# Patient Record
Sex: FEMALE | Race: WHITE | NOT HISPANIC OR LATINO | Employment: OTHER | URBAN - METROPOLITAN AREA
[De-identification: names, ages, dates, MRNs, and addresses within clinical notes are randomized per-mention and may not be internally consistent; named-entity substitution may affect disease eponyms.]

---

## 2017-05-03 ENCOUNTER — COMMUNICATION - HEALTHEAST (OUTPATIENT)
Dept: MEDSURG UNIT | Facility: CLINIC | Age: 82
End: 2017-05-03

## 2017-05-03 DIAGNOSIS — I10 UNSPECIFIED ESSENTIAL HYPERTENSION: ICD-10-CM

## 2017-05-11 ENCOUNTER — COMMUNICATION - HEALTHEAST (OUTPATIENT)
Dept: SCHEDULING | Facility: CLINIC | Age: 82
End: 2017-05-11

## 2017-05-15 ENCOUNTER — COMMUNICATION - HEALTHEAST (OUTPATIENT)
Dept: INTERNAL MEDICINE | Facility: CLINIC | Age: 82
End: 2017-05-15

## 2017-05-15 DIAGNOSIS — I10 UNSPECIFIED ESSENTIAL HYPERTENSION: ICD-10-CM

## 2017-05-22 ENCOUNTER — COMMUNICATION - HEALTHEAST (OUTPATIENT)
Dept: INTERNAL MEDICINE | Facility: CLINIC | Age: 82
End: 2017-05-22

## 2017-05-22 DIAGNOSIS — I10 HTN (HYPERTENSION): ICD-10-CM

## 2018-05-10 ENCOUNTER — COMMUNICATION - HEALTHEAST (OUTPATIENT)
Dept: INTERNAL MEDICINE | Facility: CLINIC | Age: 83
End: 2018-05-10

## 2018-05-10 DIAGNOSIS — I10 HTN (HYPERTENSION): ICD-10-CM

## 2018-05-10 DIAGNOSIS — I10 ESSENTIAL HYPERTENSION: ICD-10-CM

## 2018-08-26 ENCOUNTER — COMMUNICATION - HEALTHEAST (OUTPATIENT)
Dept: INTERNAL MEDICINE | Facility: CLINIC | Age: 83
End: 2018-08-26

## 2018-08-26 DIAGNOSIS — I10 ESSENTIAL HYPERTENSION: ICD-10-CM

## 2018-08-26 DIAGNOSIS — I10 HTN (HYPERTENSION): ICD-10-CM

## 2018-11-27 ENCOUNTER — OFFICE VISIT - HEALTHEAST (OUTPATIENT)
Dept: INTERNAL MEDICINE | Facility: CLINIC | Age: 83
End: 2018-11-27

## 2018-11-27 DIAGNOSIS — F41.1 GENERALIZED ANXIETY DISORDER: ICD-10-CM

## 2018-11-27 DIAGNOSIS — I10 HYPERTENSION, UNSPECIFIED TYPE: ICD-10-CM

## 2018-11-27 DIAGNOSIS — G45.9 TIA (TRANSIENT ISCHEMIC ATTACK): ICD-10-CM

## 2018-11-27 DIAGNOSIS — I10 ESSENTIAL HYPERTENSION: ICD-10-CM

## 2018-11-27 DIAGNOSIS — I10 HTN (HYPERTENSION): ICD-10-CM

## 2018-11-27 DIAGNOSIS — N20.0 NEPHROLITHIASIS: ICD-10-CM

## 2018-11-27 DIAGNOSIS — F34.1 DYSTHYMIA: ICD-10-CM

## 2018-11-27 LAB
ALBUMIN SERPL-MCNC: 3.7 G/DL (ref 3.5–5)
ALP SERPL-CCNC: 105 U/L (ref 45–120)
ALT SERPL W P-5'-P-CCNC: 15 U/L (ref 0–45)
ANION GAP SERPL CALCULATED.3IONS-SCNC: 11 MMOL/L (ref 5–18)
AST SERPL W P-5'-P-CCNC: 23 U/L (ref 0–40)
BILIRUB SERPL-MCNC: 0.7 MG/DL (ref 0–1)
BUN SERPL-MCNC: 22 MG/DL (ref 8–28)
CALCIUM SERPL-MCNC: 9.8 MG/DL (ref 8.5–10.5)
CHLORIDE BLD-SCNC: 105 MMOL/L (ref 98–107)
CO2 SERPL-SCNC: 27 MMOL/L (ref 22–31)
CREAT SERPL-MCNC: 0.69 MG/DL (ref 0.6–1.1)
ERYTHROCYTE [DISTWIDTH] IN BLOOD BY AUTOMATED COUNT: 12.6 % (ref 11–14.5)
GFR SERPL CREATININE-BSD FRML MDRD: >60 ML/MIN/1.73M2
GLUCOSE BLD-MCNC: 117 MG/DL (ref 70–125)
HCT VFR BLD AUTO: 41.7 % (ref 35–47)
HGB BLD-MCNC: 13.8 G/DL (ref 12–16)
MCH RBC QN AUTO: 30.1 PG (ref 27–34)
MCHC RBC AUTO-ENTMCNC: 33 G/DL (ref 32–36)
MCV RBC AUTO: 91 FL (ref 80–100)
PLATELET # BLD AUTO: 199 THOU/UL (ref 140–440)
PMV BLD AUTO: 9.7 FL (ref 7–10)
POTASSIUM BLD-SCNC: 3.9 MMOL/L (ref 3.5–5)
PROT SERPL-MCNC: 6.7 G/DL (ref 6–8)
RBC # BLD AUTO: 4.58 MILL/UL (ref 3.8–5.4)
SODIUM SERPL-SCNC: 143 MMOL/L (ref 136–145)
WBC: 6 THOU/UL (ref 4–11)

## 2018-11-27 RX ORDER — IBUPROFEN 400 MG/1
400 TABLET, FILM COATED ORAL EVERY 6 HOURS PRN
Qty: 30 TABLET | Refills: 0 | Status: SHIPPED | OUTPATIENT
Start: 2018-11-27 | End: 2021-07-19

## 2018-11-27 ASSESSMENT — MIFFLIN-ST. JEOR: SCORE: 1355.75

## 2018-11-28 ENCOUNTER — COMMUNICATION - HEALTHEAST (OUTPATIENT)
Dept: INTERNAL MEDICINE | Facility: CLINIC | Age: 83
End: 2018-11-28

## 2019-02-11 ENCOUNTER — COMMUNICATION - HEALTHEAST (OUTPATIENT)
Dept: INTERNAL MEDICINE | Facility: CLINIC | Age: 84
End: 2019-02-11

## 2019-02-11 DIAGNOSIS — F41.1 GENERALIZED ANXIETY DISORDER: ICD-10-CM

## 2019-02-13 RX ORDER — BUSPIRONE HYDROCHLORIDE 15 MG/1
15 TABLET ORAL DAILY PRN
Qty: 30 TABLET | Refills: 9 | Status: SHIPPED | OUTPATIENT
Start: 2019-02-13 | End: 2021-07-20

## 2019-03-21 ENCOUNTER — COMMUNICATION - HEALTHEAST (OUTPATIENT)
Dept: INTERNAL MEDICINE | Facility: CLINIC | Age: 84
End: 2019-03-21

## 2019-03-21 DIAGNOSIS — I10 HTN (HYPERTENSION): ICD-10-CM

## 2019-03-21 DIAGNOSIS — I10 ESSENTIAL HYPERTENSION: ICD-10-CM

## 2019-03-29 ENCOUNTER — OFFICE VISIT - HEALTHEAST (OUTPATIENT)
Dept: INTERNAL MEDICINE | Facility: CLINIC | Age: 84
End: 2019-03-29

## 2019-03-29 DIAGNOSIS — I82.499 DEEP VEIN THROMBOSIS (DVT) OF OTHER VEIN OF LOWER EXTREMITY, UNSPECIFIED CHRONICITY, UNSPECIFIED LATERALITY (H): ICD-10-CM

## 2019-03-29 DIAGNOSIS — F51.01 PRIMARY INSOMNIA: ICD-10-CM

## 2019-03-29 DIAGNOSIS — F41.1 GENERALIZED ANXIETY DISORDER: ICD-10-CM

## 2019-03-29 DIAGNOSIS — G45.9 TIA (TRANSIENT ISCHEMIC ATTACK): ICD-10-CM

## 2019-03-29 DIAGNOSIS — I10 HYPERTENSION, UNSPECIFIED TYPE: ICD-10-CM

## 2019-03-29 DIAGNOSIS — E66.9 OBESITY WITHOUT SERIOUS COMORBIDITY, UNSPECIFIED CLASSIFICATION, UNSPECIFIED OBESITY TYPE: ICD-10-CM

## 2019-03-29 ASSESSMENT — MIFFLIN-ST. JEOR: SCORE: 1355.75

## 2019-05-02 ENCOUNTER — COMMUNICATION - HEALTHEAST (OUTPATIENT)
Dept: CARE COORDINATION | Facility: CLINIC | Age: 84
End: 2019-05-02

## 2019-05-10 ENCOUNTER — OFFICE VISIT - HEALTHEAST (OUTPATIENT)
Dept: INTERNAL MEDICINE | Facility: CLINIC | Age: 84
End: 2019-05-10

## 2019-05-10 DIAGNOSIS — R42 POSTURAL DIZZINESS: ICD-10-CM

## 2019-05-10 ASSESSMENT — MIFFLIN-ST. JEOR: SCORE: 1346.68

## 2019-09-19 ENCOUNTER — COMMUNICATION - HEALTHEAST (OUTPATIENT)
Dept: INTERNAL MEDICINE | Facility: CLINIC | Age: 84
End: 2019-09-19

## 2019-09-19 DIAGNOSIS — I10 HTN (HYPERTENSION): ICD-10-CM

## 2019-09-19 DIAGNOSIS — I10 ESSENTIAL HYPERTENSION: ICD-10-CM

## 2020-07-03 ENCOUNTER — OFFICE VISIT - HEALTHEAST (OUTPATIENT)
Dept: INTERNAL MEDICINE | Facility: CLINIC | Age: 85
End: 2020-07-03

## 2020-07-03 DIAGNOSIS — I10 ESSENTIAL HYPERTENSION: ICD-10-CM

## 2020-07-03 DIAGNOSIS — F41.1 GENERALIZED ANXIETY DISORDER: ICD-10-CM

## 2020-07-03 DIAGNOSIS — I10 HTN (HYPERTENSION): ICD-10-CM

## 2020-07-03 DIAGNOSIS — I82.499 DEEP VEIN THROMBOSIS (DVT) OF OTHER VEIN OF LOWER EXTREMITY, UNSPECIFIED CHRONICITY, UNSPECIFIED LATERALITY (H): ICD-10-CM

## 2020-07-03 DIAGNOSIS — E78.2 MIXED HYPERLIPIDEMIA: ICD-10-CM

## 2020-07-03 RX ORDER — LISINOPRIL 10 MG/1
10 TABLET ORAL DAILY
Qty: 90 TABLET | Refills: 3 | Status: SHIPPED | OUTPATIENT
Start: 2020-07-03 | End: 2021-07-08

## 2020-07-03 RX ORDER — METOPROLOL TARTRATE 25 MG/1
25 TABLET, FILM COATED ORAL 2 TIMES DAILY
Qty: 180 TABLET | Refills: 3 | Status: SHIPPED | OUTPATIENT
Start: 2020-07-03 | End: 2021-07-07

## 2020-07-03 ASSESSMENT — PATIENT HEALTH QUESTIONNAIRE - PHQ9: SUM OF ALL RESPONSES TO PHQ QUESTIONS 1-9: 0

## 2021-05-25 ENCOUNTER — RECORDS - HEALTHEAST (OUTPATIENT)
Dept: ADMINISTRATIVE | Facility: CLINIC | Age: 86
End: 2021-05-25

## 2021-05-26 ENCOUNTER — RECORDS - HEALTHEAST (OUTPATIENT)
Dept: ADMINISTRATIVE | Facility: CLINIC | Age: 86
End: 2021-05-26

## 2021-05-27 ENCOUNTER — RECORDS - HEALTHEAST (OUTPATIENT)
Dept: ADMINISTRATIVE | Facility: CLINIC | Age: 86
End: 2021-05-27

## 2021-05-27 ASSESSMENT — PATIENT HEALTH QUESTIONNAIRE - PHQ9: SUM OF ALL RESPONSES TO PHQ QUESTIONS 1-9: 0

## 2021-05-27 NOTE — PROGRESS NOTES
Office Visit - Follow Up   Ruth Mack   87 y.o. female    Date of Visit: 3/29/2019    Chief Complaint   Patient presents with     Establish Care        Assessment and Plan   1. Hypertension, unspecified type  Blood pressure controlled continue lisinopril and metoprolol, labs reviewed    2. Deep vein thrombosis (DVT) of other vein of lower extremity, unspecified chronicity, unspecified laterality (H)  This was over 30 years ago no recurrence    3. Generalized anxiety disorder  Generally controlled with BuSpar as needed    4. TIA (transient ischemic attack)  2 years ago no recurrence, on aspirin, could consider adding a statin    5. Primary insomnia  She wants to stop mirtazapine which is okay.    6. Obesity without serious comorbidity, unspecified classification, unspecified obesity type  The following high BMI interventions were performed this visit: encouragement to exercise and lifestyle education regarding diet    Return in about 4 weeks (around 4/26/2019) for annual physical.     History of Present Illness   This 87 y.o. old woman comes in for follow-up.  She is a patient of Dr. Sandor Hay but has not seen him for years.  She did see Dr. Emiliano Ratliff in the fall.  She is now here to follow-up.  She does not really have any specific concerns.  She is considering switching physicians but is not sure.  She does not want and establish care or physical exam visit at this point.  We did review her medical history and updated the chart.  Some left shoulder pain with activity which responds well to ice rest and ibuprofen or acetaminophen.  She does have possible history of a TIA no recurrence and this was a couple of years ago.  She does take an aspirin.  She is not on a statin.  She was put on Remeron to help her with sleep but does not want to take this anymore.    Review of Systems: A comprehensive review of systems was negative except as noted.     Medications, Allergies and Problem List   Reviewed,  "reconciled and updated     Physical Exam   General Appearance:   No acute distress    /66 (Patient Site: Left Arm, Patient Position: Sitting, Cuff Size: Adult Regular)   Pulse 62   Ht 5' 5\" (1.651 m)   Wt 205 lb (93 kg)   SpO2 92%   BMI 34.11 kg/m      HEENT exam is unremarkable  Neck supple no thyromegaly or nodule palpable  Lymphatic no cervical lymphadenopathy  Cardiovascular regular rate and rhythm no murmur gallop or rub  Pulmonary lungs are clear to auscultation bilaterally  Gastrointestinall abdomen soft nontender nondistended no organomegaly  Neurologic exam is non focal  Psychiatric pleasant, no confusion or agitation        Additional Information   Current Outpatient Medications   Medication Sig Dispense Refill     aspirin 81 mg chewable tablet Chew 81 mg daily.       busPIRone (BUSPAR) 15 MG tablet TAKE 1 TABLET (15 MG TOTAL) BY MOUTH DAILY AS NEEDED. 30 tablet 9     ibuprofen (ADVIL,MOTRIN) 400 MG tablet Take 1 tablet (400 mg total) by mouth every 6 (six) hours as needed for pain. 30 tablet 0     lisinopril (PRINIVIL,ZESTRIL) 10 MG tablet Take 1 tablet (10 mg total) by mouth daily. 90 tablet 1     metoprolol tartrate (LOPRESSOR) 25 MG tablet Take 1 tablet (25 mg total) by mouth 2 (two) times a day. 180 tablet 1     No current facility-administered medications for this visit.      Allergies   Allergen Reactions     Codeine Other (See Comments)     hallucinations     Social History     Tobacco Use     Smoking status: Never Smoker     Smokeless tobacco: Never Used   Substance Use Topics     Alcohol use: Yes     Comment: rare     Drug use: No       Review and/or order of clinical lab tests:  Review and/or order of radiology tests:  Review and/or order of medicine tests:  Discussion of test results with performing physician:  Decision to obtain old records and/or obtain history from someone other than the patient:  Review and summarization of old records and/or obtaining history from someone other " than the patient and.or discussion of case with another health care provider:  Independent visualization of image, tracing or specimen itself:    Time:      Steve Puente MD

## 2021-05-28 ENCOUNTER — RECORDS - HEALTHEAST (OUTPATIENT)
Dept: ADMINISTRATIVE | Facility: CLINIC | Age: 86
End: 2021-05-28

## 2021-05-28 NOTE — PROGRESS NOTES
"  Office Visit - Follow Up   Ruth Mack   87 y.o. female    Date of Visit: 5/10/2019    Chief Complaint   Patient presents with     Hospital Visit Follow Up     Was in the hospital with Vertigo, hasn't had any more spells        Assessment and Plan   1. Postural dizziness  Ruth was in the hospital 24 hours beginning on 4/30/2019.  She presented with incapacitating dizziness.  In the ER she could not get about the ER nor could she get out of her bed in the ER without help.  She had no further significant episodes in the hospital.  We monitored her.  I discharged her on meclizine.  She had no further episodes of dizziness since discharge.  In the hospital we talked about getting some home services for her as she is 87 years old and lives alone.  She refuses services.  Today she does not want any home physical therapy or occupational therapy or home nursing services.  She has a neighbor who is a physical therapist that she can call on as needed.  She drove down here to the office.  He drives very little however.          No follow-ups on file.     History of Present Illness   This 87 y.o. old seen in office follow-up today after an episode of dizziness in the hospital.  Events as described above.  I reviewed with her the discharge summary also.  She took one meclizine pill since discharge from home.  Otherwise feels entirely well.  Offers no other complaints.    Review of Systems: A comprehensive review of systems was negative except as noted.     Medications, Allergies and Problem List   Reviewed, reconciled and updated     Physical Exam   General Appearance:       /82 (Patient Site: Right Arm, Patient Position: Sitting, Cuff Size: Adult Large)   Pulse 76   Ht 5' 5\" (1.651 m)   Wt 203 lb (92.1 kg)   SpO2 96%   BMI 33.78 kg/m      No nystagmus.  EOMs are full.  Weight is down 8 pounds compared to visit here in March/2019.  Blood pressure is good at 138/82 pulse is regular.  Lungs are clear heart " shows no murmurs.     Additional Information   Current Outpatient Medications   Medication Sig Dispense Refill     aspirin 81 mg chewable tablet Chew 81 mg daily.       busPIRone (BUSPAR) 15 MG tablet TAKE 1 TABLET (15 MG TOTAL) BY MOUTH DAILY AS NEEDED. 30 tablet 9     ibuprofen (ADVIL,MOTRIN) 400 MG tablet Take 1 tablet (400 mg total) by mouth every 6 (six) hours as needed for pain. 30 tablet 0     lisinopril (PRINIVIL,ZESTRIL) 10 MG tablet Take 1 tablet (10 mg total) by mouth daily. 90 tablet 1     meclizine (ANTIVERT) 12.5 mg tablet Take 1 tablet (12.5 mg total) by mouth 4 (four) times a day as needed for dizziness. 30 tablet 0     metoprolol tartrate (LOPRESSOR) 25 MG tablet Take 1 tablet (25 mg total) by mouth 2 (two) times a day. 180 tablet 1     No current facility-administered medications for this visit.      Allergies   Allergen Reactions     Codeine Other (See Comments)     hallucinations     Social History     Tobacco Use     Smoking status: Never Smoker     Smokeless tobacco: Never Used   Substance Use Topics     Alcohol use: Yes     Comment: rare     Drug use: No       Review and/or order of clinical lab tests:  Review and/or order of radiology tests:  Review and/or order of medicine tests:  Discussion of test results with performing physician:  Decision to obtain old records and/or obtain history from someone other than the patient:  Review and summarization of old records and/or obtaining history from someone other than the patient and.or discussion of case with another health care provider:  Independent visualization of image, tracing or specimen itself:    Time: total time spent with the patient was 25 minutes of which >50% was spent in counseling and coordination of care     Sandor Hay MD

## 2021-05-28 NOTE — PROGRESS NOTES
"TCM DISCHARGE FOLLOW UP CALL    Discharge Date:  5/1/2019  Reason for hospital stay (discharge diagnosis)::  Dizzness  Are you feeling better, the same or worse since your discharge?:  Patient is feeling better (Slept well, had a good night.  No dizziness since Tuesday night in the hospital.)  Do you feel like you have a plan in the event of a health emergency?: Yes (Call daughter, neighbor)    As part of your discharge plan, were  home care services ordered for you?: No    Did you receive any new medications, or was there a change to your medications?: Yes    Are you taking those medications, or do you have any established regiment?:  RN reviewed discharge medications w/pt.  Pt states she picked up meclizine to have on hand, but hasn't had to take it.  She asked about a prescription for a \"statin,\" stating she thought she was given a statin in the hospital but wasn't discharged w/any medications other than meclizine.  RN advised pt discuss starting a statin w/Dr. Hay at f/u appt, and pt felt comfortable w/this plan.  Do you have any follow up visits scheduled with your PCP or Specialist?:  No  I'm glad to hear you're doing well and we want you to continue to do well. Your PCP would like to see you for a follow-up visit. Can we help set that up for your today?: Yes    (RN) Patient was assisted in making appointment and/or given number to [facility].  If there are immediate concerns, In Basket messge route to the PCP with a summary of concern.:  RN assisted patient in scheduling appt (Dr. Hay 5/10/19)      Lucila Avendano RN Care Manager, Population Health    "

## 2021-05-29 ENCOUNTER — RECORDS - HEALTHEAST (OUTPATIENT)
Dept: ADMINISTRATIVE | Facility: CLINIC | Age: 86
End: 2021-05-29

## 2021-05-30 ENCOUNTER — RECORDS - HEALTHEAST (OUTPATIENT)
Dept: ADMINISTRATIVE | Facility: CLINIC | Age: 86
End: 2021-05-30

## 2021-06-01 NOTE — TELEPHONE ENCOUNTER
Refill Approved    Rx renewed per Medication Renewal Policy. Medication was last renewed on 3/21/19.    Thais Mitchell, [facility] Triage/Med Refill 9/19/2019     Requested Prescriptions   Pending Prescriptions Disp Refills     metoprolol tartrate (LOPRESSOR) 25 MG tablet [Pharmacy Med Name: METOPROLOL TARTRATE 25 MG TAB] 180 tablet 1     Sig: TAKE 1 TABLET BY MOUTH TWICE A DAY       Beta-Blockers Refill Protocol Passed - 9/19/2019 12:00 PM        Passed - PCP or prescribing provider visit in past 12 months or next 3 months     Last office visit with prescriber/PCP: 5/10/2019 Sandor Hay MD OR same dept: 5/10/2019 Sandor Hay MD OR same specialty: 5/10/2019 Sandor Hay MD  Last physical: Visit date not found Last MTM visit: Visit date not found   Next visit within 3 mo: Visit date not found  Next physical within 3 mo: Visit date not found  Prescriber OR PCP: Sandor Hay MD  Last diagnosis associated with med order: 1. HTN (hypertension)  - metoprolol tartrate (LOPRESSOR) 25 MG tablet [Pharmacy Med Name: METOPROLOL TARTRATE 25 MG TAB]; TAKE 1 TABLET BY MOUTH TWICE A DAY  Dispense: 180 tablet; Refill: 1    2. Essential hypertension  - lisinopril (PRINIVIL,ZESTRIL) 10 MG tablet [Pharmacy Med Name: LISINOPRIL 10 MG TABLET]; TAKE 1 TABLET BY MOUTH EVERY DAY  Dispense: 90 tablet; Refill: 1    If protocol passes may refill for 12 months if within 3 months of last provider visit (or a total of 15 months).             Passed - Blood pressure filed in past 12 months     BP Readings from Last 1 Encounters:   05/10/19 138/82             lisinopril (PRINIVIL,ZESTRIL) 10 MG tablet [Pharmacy Med Name: LISINOPRIL 10 MG TABLET] 90 tablet 1     Sig: TAKE 1 TABLET BY MOUTH EVERY DAY       Ace Inhibitors Refill Protocol Passed - 9/19/2019 12:00 PM        Passed - PCP or prescribing provider visit in past 12 months       Last office visit with prescriber/PCP: 5/10/2019 Sandor Hay MD  OR same dept: 5/10/2019 Sandor Hay MD OR same specialty: 5/10/2019 Sandor Hay MD  Last physical: Visit date not found Last MTM visit: Visit date not found   Next visit within 3 mo: Visit date not found  Next physical within 3 mo: Visit date not found  Prescriber OR PCP: Sandor Hay MD  Last diagnosis associated with med order: 1. HTN (hypertension)  - metoprolol tartrate (LOPRESSOR) 25 MG tablet [Pharmacy Med Name: METOPROLOL TARTRATE 25 MG TAB]; TAKE 1 TABLET BY MOUTH TWICE A DAY  Dispense: 180 tablet; Refill: 1    2. Essential hypertension  - lisinopril (PRINIVIL,ZESTRIL) 10 MG tablet [Pharmacy Med Name: LISINOPRIL 10 MG TABLET]; TAKE 1 TABLET BY MOUTH EVERY DAY  Dispense: 90 tablet; Refill: 1    If protocol passes may refill for 12 months if within 3 months of last provider visit (or a total of 15 months).             Passed - Serum Potassium in past 12 months     Lab Results   Component Value Date    Potassium 4.1 04/30/2019             Passed - Blood pressure filed in past 12 months     BP Readings from Last 1 Encounters:   05/10/19 138/82             Passed - Serum Creatinine in past 12 months     Creatinine   Date Value Ref Range Status   04/30/2019 0.70 0.60 - 1.10 mg/dL Final

## 2021-06-02 VITALS — HEIGHT: 65 IN | BODY MASS INDEX: 34.16 KG/M2 | WEIGHT: 205 LBS

## 2021-06-03 VITALS — WEIGHT: 203 LBS | HEIGHT: 65 IN | BODY MASS INDEX: 33.82 KG/M2

## 2021-06-09 NOTE — PROGRESS NOTES
"Ruth Mack is a 88 y.o. female who is being evaluated via a billable telephone visit.      The patient has been notified of following:     \"This telephone visit will be conducted via a call between you and your physician/provider. We have found that certain health care needs can be provided without the need for a physical exam.  This service lets us provide the care you need with a short phone conversation.  If a prescription is necessary we can send it directly to your pharmacy.  If lab work is needed we can place an order for that and you can then stop by our lab to have the test done at a later time.    Telephone visits are billed at different rates depending on your insurance coverage. During this emergency period, for some insurers they may be billed the same as an in-person visit.  Please reach out to your insurance provider with any questions.    If during the course of the call the physician/provider feels a telephone visit is not appropriate, you will not be charged for this service.\"    Patient has given verbal consent to a Telephone visit? Yes    What phone number would you like to be contacted at? [phone]    Patient would like to receive their AVS by AVS Preference: Mail a copy.    Additional provider notes: This woman is seen via telephone visit.  Doing well.  Mental status okay, speech normal, breathing unlabored.  Does not want a be seen in clinic because of COVID-19.  Feels well no issues.  Wants some refills on medication.  Would like to establish care when it safe for her to leave her home.    Assessment/Plan:  1. Essential hypertension  Reports this is been well controlled  - lisinopriL (PRINIVIL,ZESTRIL) 10 MG tablet; Take 1 tablet (10 mg total) by mouth daily.  Dispense: 90 tablet; Refill: 3  - metoprolol tartrate (LOPRESSOR) 25 MG tablet; Take 1 tablet (25 mg total) by mouth 2 (two) times a day.  Dispense: 180 tablet; Refill: 3    3. Generalized anxiety disorder  Not using buspirone " anytime recently has it on hand as needed    4. Deep vein thrombosis (DVT) of other vein of lower extremity, unspecified chronicity, unspecified laterality (H)  30 years ago no recurrence    5. Mixed hyperlipidemia  Not interested in taking a statin    Phone call duration:  15 minutes    Steve Puente MD

## 2021-06-21 NOTE — PROGRESS NOTES
"Office Visit - Follow up    Ruth Mack   87 y.o. female    Date of Visit: 11/27/2018    Chief Complaint   Patient presents with     Follow-up     Check up- needs meds refilled       Subjective: Pleasant 87-year-old independent living patient formerly seen by Dr. Hay however is seeking another physician.  Has not been seen here for at least 3 years.  She will be establishing relationship with a new primary physician and is in the process of scheduling this.  Today she needs refill of medications and follow-up of current status    Previous history as noted history of TIA without recurrent symptoms.  Has been on antiplatelet therapy without recurrence and previous evaluation noted    Intermittent anxiety symptoms she does take buspirone as needed I have refilled this prescription and given her guidelines for use of this i.e. 15 mg daily preferably when she is having significant anxiety and discouraged use of this on a as needed basis    History of nephrolithiasis without recurrence she has no flank pain hematuria etc.    History of hypertension well controlled on lisinopril and metoprolol but this is reviewed in detail.  I would continue with current regimen and have refilled for 2 months.    Also has intermittent symptoms of dysthymia.  She steadfastly denies any suicidal ideation and otherwise is well she has had modest symptoms of despondency and depressed mood.  She would like to begin an antidepressant and would prefer not to see a psychiatrist.  This was discussed at length        ROS: A comprehensive review of systems was performed and was otherwise negative except as mentioned above.     Exam  PHQ 9 score is 7 primarily some fatigue and depressed mood without suicidal ideation.  Vital signs as noted    Head and neck normal EENT negative heart and lungs unremarkable   BP (!) 126/92 (Patient Site: Left Arm, Patient Position: Sitting)   Pulse 88   Ht 5' 5\" (1.651 m)   Wt 205 lb (93 kg)   SpO2 95%  "  BMI 34.11 kg/m      Assessment and Plan  I have refilled medications as requested.  We will not make any changes in her regimen but did discuss all of these meds in detail.  I have recommended routine labs which will be ordered reviewed and discussed    I have discussed the use of mirtazapine for mild dysthymic symptoms.  Will begin 15 mg at bedtime discussed specific precautions etc.    We will arrange for her to meet with a new primary physician who will continue to coordinate care and refer to mental health professional if deemed necessary no other changes    Ruth was seen today for follow-up.    Diagnoses and all orders for this visit:    Generalized anxiety disorder  -     HM2(CBC w/o Differential); Future  -     Comprehensive Metabolic Panel; Future  -     HM2(CBC w/o Differential)  -     Comprehensive Metabolic Panel    Essential hypertension  -     lisinopril (PRINIVIL,ZESTRIL) 10 MG tablet; Take 1 tablet (10 mg total) by mouth daily.  -     HM2(CBC w/o Differential); Future  -     Comprehensive Metabolic Panel; Future  -     HM2(CBC w/o Differential)  -     Comprehensive Metabolic Panel    HTN (hypertension)  -     metoprolol tartrate (LOPRESSOR) 25 MG tablet; Take 1 tablet (25 mg total) by mouth 2 (two) times a day.  -     HM2(CBC w/o Differential); Future  -     Comprehensive Metabolic Panel; Future  -     HM2(CBC w/o Differential)  -     Comprehensive Metabolic Panel    Nephrolithiasis  -     HM2(CBC w/o Differential); Future  -     Comprehensive Metabolic Panel; Future  -     HM2(CBC w/o Differential)  -     Comprehensive Metabolic Panel    TIA (transient ischemic attack)  -     HM2(CBC w/o Differential); Future  -     Comprehensive Metabolic Panel; Future  -     HM2(CBC w/o Differential)  -     Comprehensive Metabolic Panel    Hypertension, unspecified type  -     HM2(CBC w/o Differential); Future  -     Comprehensive Metabolic Panel; Future  -     HM2(CBC w/o Differential)  -     Comprehensive  Metabolic Panel    Dysthymia  -     HM2(CBC w/o Differential); Future  -     Comprehensive Metabolic Panel; Future  -     HM2(CBC w/o Differential)  -     Comprehensive Metabolic Panel    Other orders  -     busPIRone (BUSPAR) 15 MG tablet; Take 1 tablet (15 mg total) by mouth daily as needed.  -     ibuprofen (ADVIL,MOTRIN) 400 MG tablet; Take 1 tablet (400 mg total) by mouth every 6 (six) hours as needed for pain.  -     mirtazapine (REMERON) 15 MG tablet; Take 1 tablet (15 mg total) by mouth at bedtime.          Time: total time spent with the patient was 30 minutes of which >50% was spent in counseling and coordination of care        Allergies   Allergen Reactions     Codeine Other (See Comments)     hallucinations       Medications :  Prior to Admission medications    Medication Sig Start Date End Date Taking? Authorizing Provider   aspirin 81 mg chewable tablet Chew 81 mg daily.   Yes PROVIDER, HISTORICAL   busPIRone (BUSPAR) 15 MG tablet Take 1 tablet (15 mg total) by mouth daily as needed. 11/27/18  Yes Emiliano Ratliff MD   ibuprofen (ADVIL,MOTRIN) 400 MG tablet Take 1 tablet (400 mg total) by mouth every 6 (six) hours as needed for pain. 11/27/18  Yes Emiliano Ratliff MD   lisinopril (PRINIVIL,ZESTRIL) 10 MG tablet Take 1 tablet (10 mg total) by mouth daily. 11/27/18  Yes Emiliano Ratliff MD   metoprolol tartrate (LOPRESSOR) 25 MG tablet Take 1 tablet (25 mg total) by mouth 2 (two) times a day. 11/27/18  Yes Emiliano Ratliff MD   busPIRone (BUSPAR) 15 MG tablet Take 15 mg by mouth daily as needed.  11/27/18 Yes PROVIDER, HISTORICAL   ibuprofen (ADVIL,MOTRIN) 400 MG tablet Take 400 mg by mouth every 6 (six) hours as needed for pain.        11/27/18 Yes PROVIDER, HISTORICAL   lisinopril (PRINIVIL,ZESTRIL) 10 MG tablet TAKE 1 TABLET BY MOUTH EVERY DAY 8/27/18 11/27/18 Yes Sandor Hay MD   metoprolol tartrate (LOPRESSOR) 25 MG tablet TAKE 1 TABLET BY MOUTH TWICE A DAY 8/27/18 11/27/18 Yes Sandor RICE  MD [place]   mirtazapine (REMERON) 15 MG tablet Take 1 tablet (15 mg total) by mouth at bedtime. 11/27/18   Emiliano Ratliff MD   metoprolol (LOPRESSOR) 50 MG tablet Take 0.5 tablets (25 mg total) by mouth 2 (two) times a day. 4/17/15 11/27/18  Sandor Hay MD        Past Medical History:   Past Medical History:   Diagnosis Date     Deep vein thrombosis (H)      Generalized anxiety disorder      Hyperlipidemia      Hypertension      Kidney stone      Nephrolithiasis      Obesity      TIA (transient ischemic attack)        Past Surgical History:   Past Surgical History:   Procedure Laterality Date     SHOULDER SURGERY Left        Social History:   Social History     Socioeconomic History     Marital status:      Spouse name: Not on file     Number of children: Not on file     Years of education: Not on file     Highest education level: Not on file   Social Needs     Financial resource strain: Not on file     Food insecurity - worry: Not on file     Food insecurity - inability: Not on file     Transportation needs - medical: Not on file     Transportation needs - non-medical: Not on file   Occupational History     Occupation: retired   Tobacco Use     Smoking status: Never Smoker     Smokeless tobacco: Never Used   Substance and Sexual Activity     Alcohol use: Yes     Comment: rare     Drug use: No     Sexual activity: Not on file   Other Topics Concern     Not on file   Social History Narrative     Not on file       Family History:   Family History   Problem Relation Age of Onset     Cancer Mother      Heart disease Mother      Heart disease Father      Cancer Sister      Cancer Daughter         breast         Emiliano Ratliff MD

## 2021-06-24 NOTE — TELEPHONE ENCOUNTER
Refill Approved    Rx renewed per Medication Renewal Policy. Medication was last renewed on 11/27/18.    Thais Mitchell, [facility] Triage/Med Refill 2/13/2019     Requested Prescriptions   Pending Prescriptions Disp Refills     busPIRone (BUSPAR) 15 MG tablet [Pharmacy Med Name: BUSPIRONE HCL 15 MG TABLET] 30 tablet 0     Sig: TAKE 1 TABLET (15 MG TOTAL) BY MOUTH DAILY AS NEEDED.    Tricyclics/Misc Antidepressant/Antianxiety Meds Refill Protocol Passed - 2/11/2019 12:38 PM       Passed - PCP or prescribing provider visit in last year    Last office visit with prescriber/PCP: 11/27/2018 Emiliano Ratliff MD OR same dept: 11/27/2018 Emiliano Ratliff MD OR same specialty: 11/27/2018 Emiliano Ratliff MD  Last physical: Visit date not found Last MTM visit: Visit date not found   Next visit within 3 mo: Visit date not found  Next physical within 3 mo: Visit date not found  Prescriber OR PCP: Emiliano Ratliff MD  Last diagnosis associated with med order: There are no diagnoses linked to this encounter.  If protocol passes may refill for 12 months if within 3 months of last provider visit (or a total of 15 months).

## 2021-06-25 NOTE — TELEPHONE ENCOUNTER
Refill Approved    Rx renewed per Medication Renewal Policy. Medication was last renewed on 11/27/18.    Thais Mitchell, [facility] Triage/Med Refill 3/21/2019     Requested Prescriptions   Pending Prescriptions Disp Refills     metoprolol tartrate (LOPRESSOR) 25 MG tablet 180 tablet 0     Sig: Take 1 tablet (25 mg total) by mouth 2 (two) times a day.    Beta-Blockers Refill Protocol Passed - 3/21/2019  2:13 PM       Passed - PCP or prescribing provider visit in past 12 months or next 3 months    Last office visit with prescriber/PCP: 4/8/2015 Sandor Hay MD OR same dept: 11/27/2018 Emiliano Ratliff MD OR same specialty: 11/27/2018 Emiliano Ratliff MD  Last physical: Visit date not found Last MTM visit: Visit date not found   Next visit within 3 mo: Visit date not found  Next physical within 3 mo: Visit date not found  Prescriber OR PCP: Sandor Hay MD  Last diagnosis associated with med order: 1. HTN (hypertension)  - metoprolol tartrate (LOPRESSOR) 25 MG tablet; Take 1 tablet (25 mg total) by mouth 2 (two) times a day.  Dispense: 180 tablet; Refill: 0    2. Essential hypertension  - lisinopril (PRINIVIL,ZESTRIL) 10 MG tablet; Take 1 tablet (10 mg total) by mouth daily.  Dispense: 90 tablet; Refill: 0    If protocol passes may refill for 12 months if within 3 months of last provider visit (or a total of 15 months).            Passed - Blood pressure filed in past 12 months    BP Readings from Last 1 Encounters:   11/27/18 (!) 126/92             lisinopril (PRINIVIL,ZESTRIL) 10 MG tablet 90 tablet 0     Sig: Take 1 tablet (10 mg total) by mouth daily.    Ace Inhibitors Refill Protocol Passed - 3/21/2019  2:13 PM       Passed - PCP or prescribing provider visit in past 12 months      Last office visit with prescriber/PCP: 4/8/2015 Sandor Hay MD OR same dept: 11/27/2018 Emiliano Ratliff MD OR same specialty: 11/27/2018 Emiliano Ratliff MD  Last physical: Visit date not found Last MTM  visit: Visit date not found   Next visit within 3 mo: Visit date not found  Next physical within 3 mo: Visit date not found  Prescriber OR PCP: Sandor Hay MD  Last diagnosis associated with med order: 1. HTN (hypertension)  - metoprolol tartrate (LOPRESSOR) 25 MG tablet; Take 1 tablet (25 mg total) by mouth 2 (two) times a day.  Dispense: 180 tablet; Refill: 0    2. Essential hypertension  - lisinopril (PRINIVIL,ZESTRIL) 10 MG tablet; Take 1 tablet (10 mg total) by mouth daily.  Dispense: 90 tablet; Refill: 0    If protocol passes may refill for 12 months if within 3 months of last provider visit (or a total of 15 months).            Passed - Serum Potassium in past 12 months    Lab Results   Component Value Date    Potassium 3.9 11/27/2018            Passed - Blood pressure filed in past 12 months    BP Readings from Last 1 Encounters:   11/27/18 (!) 126/92            Passed - Serum Creatinine in past 12 months    Creatinine   Date Value Ref Range Status   11/27/2018 0.69 0.60 - 1.10 mg/dL Final

## 2021-07-07 ENCOUNTER — COMMUNICATION - HEALTHEAST (OUTPATIENT)
Dept: INTERNAL MEDICINE | Facility: CLINIC | Age: 86
End: 2021-07-07

## 2021-07-07 DIAGNOSIS — I10 HTN (HYPERTENSION): ICD-10-CM

## 2021-07-07 RX ORDER — METOPROLOL TARTRATE 25 MG/1
TABLET, FILM COATED ORAL
Qty: 180 TABLET | Refills: 3 | Status: SHIPPED | OUTPATIENT
Start: 2021-07-07 | End: 2021-11-05

## 2021-07-07 NOTE — TELEPHONE ENCOUNTER
Telephone Encounter by Shelley Veliz RN at 7/7/2021  7:59 AM     Author: Shelley Veliz RN Service: -- Author Type: Registered Nurse    Filed: 7/7/2021  7:59 AM Encounter Date: 7/7/2021 Status: Signed    : Shelley Veliz RN (Registered Nurse)       RN cannot approve Refill Request    RN can NOT refill this medication PCP messaged that patient is overdue for Office Visit and BP check. Last office visit: 3/29/2019 Steve Puente MD Last Physical: Visit date not found Last MTM visit: Visit date not found Last visit same specialty: 5/10/2019 Sandor Hay MD.  Next visit within 3 mo: Visit date not found  Next physical within 3 mo: Visit date not found      [facility] Triage/Med Refill 7/7/2021    Requested Prescriptions   Pending Prescriptions Disp Refills   ? metoprolol tartrate (LOPRESSOR) 25 MG tablet [Pharmacy Med Name: METOPROLOL TARTRATE 25 MG TAB] 180 tablet 3     Sig: TAKE 1 TABLET BY MOUTH TWICE A DAY       Beta-Blockers Refill Protocol Failed - 7/7/2021 12:06 AM        Failed - PCP or prescribing provider visit in past 12 months or next 3 months     Last office visit with prescriber/PCP: 3/29/2019 Steve Puente MD OR same dept: Visit date not found OR same specialty: 5/10/2019 Sandor Hay MD  Last physical: Visit date not found Last MTM visit: Visit date not found   Next visit within 3 mo: Visit date not found  Next physical within 3 mo: Visit date not found  Prescriber OR PCP: Steve Puente MD  Last diagnosis associated with med order: 1. HTN (hypertension)  - metoprolol tartrate (LOPRESSOR) 25 MG tablet [Pharmacy Med Name: METOPROLOL TARTRATE 25 MG TAB]; TAKE 1 TABLET BY MOUTH TWICE A DAY  Dispense: 180 tablet; Refill: 3    If protocol passes may refill for 12 months if within 3 months of last provider visit (or a total of 15 months).             Failed - Blood pressure filed in past 12 months     BP Readings from Last 1  Encounters:   05/10/19 138/82

## 2021-07-08 ENCOUNTER — COMMUNICATION - HEALTHEAST (OUTPATIENT)
Dept: INTERNAL MEDICINE | Facility: CLINIC | Age: 86
End: 2021-07-08

## 2021-07-08 DIAGNOSIS — I10 ESSENTIAL HYPERTENSION: ICD-10-CM

## 2021-07-08 RX ORDER — LISINOPRIL 10 MG/1
TABLET ORAL
Qty: 90 TABLET | Refills: 3 | Status: SHIPPED | OUTPATIENT
Start: 2021-07-08 | End: 2021-11-05

## 2021-07-08 NOTE — TELEPHONE ENCOUNTER
Telephone Encounter by Shelley Veliz RN at 7/8/2021 12:06 AM     Author: Shelley Veliz RN Service: -- Author Type: Registered Nurse    Filed: 7/8/2021 12:06 AM Encounter Date: 7/8/2021 Status: Signed    : Shelley Veliz RN (Registered Nurse)       RN cannot approve Refill Request    RN can NOT refill this medication PCP messaged that patient is overdue for Labs and Office Visit. Last office visit: 3/29/2019 Steve Puente MD Last Physical: Visit date not found Last MTM visit: Visit date not found Last visit same specialty: 5/10/2019 Sandor Hay MD.  Next visit within 3 mo: Visit date not found  Next physical within 3 mo: Visit date not found      [facility] Triage/Med Refill 7/8/2021    Requested Prescriptions   Pending Prescriptions Disp Refills   ? lisinopriL (PRINIVIL,ZESTRIL) 10 MG tablet [Pharmacy Med Name: LISINOPRIL 10 MG TABLET] 90 tablet 3     Sig: TAKE 1 TABLET BY MOUTH EVERY DAY       Ace Inhibitors Refill Protocol Failed - 7/8/2021 12:05 AM        Failed - PCP or prescribing provider visit in past 12 months       Last office visit with prescriber/PCP: 3/29/2019 Steve Puente MD OR same dept: Visit date not found OR same specialty: 5/10/2019 Sandor Hay MD  Last physical: Visit date not found Last MTM visit: Visit date not found   Next visit within 3 mo: Visit date not found  Next physical within 3 mo: Visit date not found  Prescriber OR PCP: Steve Puente MD  Last diagnosis associated with med order: 1. Essential hypertension  - lisinopriL (PRINIVIL,ZESTRIL) 10 MG tablet [Pharmacy Med Name: LISINOPRIL 10 MG TABLET]; TAKE 1 TABLET BY MOUTH EVERY DAY  Dispense: 90 tablet; Refill: 3    If protocol passes may refill for 12 months if within 3 months of last provider visit (or a total of 15 months).             Failed - Serum Potassium in past 12 months     No results found for: LN-POTASSIUM          Failed - Blood pressure  filed in past 12 months     BP Readings from Last 1 Encounters:   05/10/19 138/82             Failed - Serum Creatinine in past 12 months     Creatinine   Date Value Ref Range Status   04/30/2019 0.70 0.60 - 1.10 mg/dL Final

## 2021-07-13 ENCOUNTER — RECORDS - HEALTHEAST (OUTPATIENT)
Dept: ADMINISTRATIVE | Facility: CLINIC | Age: 86
End: 2021-07-13

## 2021-07-16 DIAGNOSIS — M25.50 MULTIPLE JOINT PAIN: ICD-10-CM

## 2021-07-16 DIAGNOSIS — M25.50 PAIN IN JOINT: Primary | ICD-10-CM

## 2021-07-19 DIAGNOSIS — F41.1 GENERALIZED ANXIETY DISORDER: ICD-10-CM

## 2021-07-19 RX ORDER — IBUPROFEN 400 MG/1
400 TABLET, FILM COATED ORAL EVERY 6 HOURS PRN
Qty: 30 TABLET | Refills: 0 | Status: SHIPPED | OUTPATIENT
Start: 2021-07-19 | End: 2021-11-05

## 2021-07-20 RX ORDER — BUSPIRONE HYDROCHLORIDE 15 MG/1
15 TABLET ORAL DAILY PRN
Qty: 30 TABLET | Refills: 9 | Status: SHIPPED | OUTPATIENT
Start: 2021-07-20 | End: 2022-07-22

## 2021-07-21 ENCOUNTER — RECORDS - HEALTHEAST (OUTPATIENT)
Dept: ADMINISTRATIVE | Facility: CLINIC | Age: 86
End: 2021-07-21

## 2021-08-11 DIAGNOSIS — F41.1 GENERALIZED ANXIETY DISORDER: ICD-10-CM

## 2021-08-13 RX ORDER — BUSPIRONE HYDROCHLORIDE 15 MG/1
TABLET ORAL
Qty: 30 TABLET | Refills: 9 | OUTPATIENT
Start: 2021-08-13

## 2021-08-13 NOTE — TELEPHONE ENCOUNTER
Refill request too soon.        Shelley Veliz RN   08/13/21 5:20 PM  [facility] Nurse Advisor

## 2021-10-17 ENCOUNTER — HEALTH MAINTENANCE LETTER (OUTPATIENT)
Age: 86
End: 2021-10-17

## 2021-11-05 ENCOUNTER — OFFICE VISIT (OUTPATIENT)
Dept: INTERNAL MEDICINE | Facility: CLINIC | Age: 86
End: 2021-11-05
Payer: COMMERCIAL

## 2021-11-05 VITALS
SYSTOLIC BLOOD PRESSURE: 124 MMHG | HEART RATE: 62 BPM | BODY MASS INDEX: 29.79 KG/M2 | WEIGHT: 179 LBS | DIASTOLIC BLOOD PRESSURE: 64 MMHG | OXYGEN SATURATION: 96 %

## 2021-11-05 DIAGNOSIS — N20.0 NEPHROLITHIASIS: ICD-10-CM

## 2021-11-05 DIAGNOSIS — E78.2 MIXED HYPERLIPIDEMIA: ICD-10-CM

## 2021-11-05 DIAGNOSIS — I82.5Y2 CHRONIC DEEP VEIN THROMBOSIS (DVT) OF PROXIMAL VEIN OF LEFT LOWER EXTREMITY (H): ICD-10-CM

## 2021-11-05 DIAGNOSIS — M54.50 CHRONIC BILATERAL LOW BACK PAIN WITHOUT SCIATICA: ICD-10-CM

## 2021-11-05 DIAGNOSIS — G45.9 TIA (TRANSIENT ISCHEMIC ATTACK): ICD-10-CM

## 2021-11-05 DIAGNOSIS — I10 PRIMARY HYPERTENSION: ICD-10-CM

## 2021-11-05 DIAGNOSIS — Z00.00 ANNUAL PHYSICAL EXAM: Primary | ICD-10-CM

## 2021-11-05 DIAGNOSIS — G89.29 CHRONIC BILATERAL LOW BACK PAIN WITHOUT SCIATICA: ICD-10-CM

## 2021-11-05 DIAGNOSIS — E55.9 VITAMIN D DEFICIENCY: ICD-10-CM

## 2021-11-05 DIAGNOSIS — F41.1 GENERALIZED ANXIETY DISORDER: ICD-10-CM

## 2021-11-05 DIAGNOSIS — R26.89 POOR BALANCE: ICD-10-CM

## 2021-11-05 LAB
ALBUMIN SERPL-MCNC: 3.8 G/DL (ref 3.5–5)
ALP SERPL-CCNC: 102 U/L (ref 45–120)
ALT SERPL W P-5'-P-CCNC: 19 U/L (ref 0–45)
ANION GAP SERPL CALCULATED.3IONS-SCNC: 11 MMOL/L (ref 5–18)
AST SERPL W P-5'-P-CCNC: 27 U/L (ref 0–40)
BILIRUB SERPL-MCNC: 0.7 MG/DL (ref 0–1)
BUN SERPL-MCNC: 33 MG/DL (ref 8–28)
CALCIUM SERPL-MCNC: 9.8 MG/DL (ref 8.5–10.5)
CHLORIDE BLD-SCNC: 105 MMOL/L (ref 98–107)
CHOLEST SERPL-MCNC: 188 MG/DL
CO2 SERPL-SCNC: 28 MMOL/L (ref 22–31)
CREAT SERPL-MCNC: 0.76 MG/DL (ref 0.6–1.1)
ERYTHROCYTE [DISTWIDTH] IN BLOOD BY AUTOMATED COUNT: 14.2 % (ref 10–15)
FASTING STATUS PATIENT QL REPORTED: NO
GFR SERPL CREATININE-BSD FRML MDRD: 70 ML/MIN/1.73M2
GLUCOSE BLD-MCNC: 97 MG/DL (ref 70–125)
HCT VFR BLD AUTO: 43.4 % (ref 35–47)
HDLC SERPL-MCNC: 79 MG/DL
HGB BLD-MCNC: 13.8 G/DL (ref 11.7–15.7)
LDLC SERPL CALC-MCNC: 97 MG/DL
MCH RBC QN AUTO: 30.5 PG (ref 26.5–33)
MCHC RBC AUTO-ENTMCNC: 31.8 G/DL (ref 31.5–36.5)
MCV RBC AUTO: 96 FL (ref 78–100)
PLATELET # BLD AUTO: 203 10E3/UL (ref 150–450)
POTASSIUM BLD-SCNC: 4.5 MMOL/L (ref 3.5–5)
PROT SERPL-MCNC: 6.5 G/DL (ref 6–8)
RBC # BLD AUTO: 4.52 10E6/UL (ref 3.8–5.2)
SODIUM SERPL-SCNC: 144 MMOL/L (ref 136–145)
TRIGL SERPL-MCNC: 58 MG/DL
TSH SERPL DL<=0.005 MIU/L-ACNC: 4.04 UIU/ML (ref 0.3–5)
WBC # BLD AUTO: 6.3 10E3/UL (ref 4–11)

## 2021-11-05 PROCEDURE — 80053 COMPREHEN METABOLIC PANEL: CPT | Performed by: INTERNAL MEDICINE

## 2021-11-05 PROCEDURE — 99214 OFFICE O/P EST MOD 30 MIN: CPT | Mod: 25 | Performed by: INTERNAL MEDICINE

## 2021-11-05 PROCEDURE — G0008 ADMIN INFLUENZA VIRUS VAC: HCPCS | Performed by: INTERNAL MEDICINE

## 2021-11-05 PROCEDURE — 82306 VITAMIN D 25 HYDROXY: CPT | Performed by: INTERNAL MEDICINE

## 2021-11-05 PROCEDURE — 84443 ASSAY THYROID STIM HORMONE: CPT | Performed by: INTERNAL MEDICINE

## 2021-11-05 PROCEDURE — 36415 COLL VENOUS BLD VENIPUNCTURE: CPT | Performed by: INTERNAL MEDICINE

## 2021-11-05 PROCEDURE — 99397 PER PM REEVAL EST PAT 65+ YR: CPT | Mod: 25 | Performed by: INTERNAL MEDICINE

## 2021-11-05 PROCEDURE — 85027 COMPLETE CBC AUTOMATED: CPT | Performed by: INTERNAL MEDICINE

## 2021-11-05 PROCEDURE — 80061 LIPID PANEL: CPT | Performed by: INTERNAL MEDICINE

## 2021-11-05 PROCEDURE — 90662 IIV NO PRSV INCREASED AG IM: CPT | Performed by: INTERNAL MEDICINE

## 2021-11-05 RX ORDER — METOPROLOL TARTRATE 25 MG/1
25 TABLET, FILM COATED ORAL 2 TIMES DAILY
Qty: 180 TABLET | Refills: 3
Start: 2021-11-05 | End: 2022-07-07

## 2021-11-05 RX ORDER — SENNOSIDES 8.6 MG
1300 CAPSULE ORAL EVERY 8 HOURS PRN
Start: 2021-11-05 | End: 2023-06-22

## 2021-11-05 RX ORDER — ATORVASTATIN CALCIUM 20 MG/1
20 TABLET, FILM COATED ORAL DAILY
Qty: 90 TABLET | Refills: 3 | Status: SHIPPED | OUTPATIENT
Start: 2021-11-05 | End: 2022-09-19

## 2021-11-05 RX ORDER — LISINOPRIL 10 MG/1
10 TABLET ORAL DAILY
Qty: 90 TABLET | Refills: 3
Start: 2021-11-05 | End: 2022-01-13

## 2021-11-05 ASSESSMENT — ACTIVITIES OF DAILY LIVING (ADL)
CURRENT_FUNCTION: HOUSEWORK REQUIRES ASSISTANCE
CURRENT_FUNCTION: SHOPPING REQUIRES ASSISTANCE
CURRENT_FUNCTION: TRANSPORTATION REQUIRES ASSISTANCE
CURRENT_FUNCTION: LAUNDRY REQUIRES ASSISTANCE

## 2021-11-05 NOTE — PROGRESS NOTES
"SUBJECTIVE:   Ruth Mack is a 89 year old female who presents for Preventive Visit.  This 89-year-old woman comes in for annual wellness visit.  Its been a minute since have seen her.  She comes in with her daughter.  Still living independently.  Having issues with strength and balance.  History of TIA/strokes.  She is on aspirin.  She is not on a statin.  Underlying hypertension which is well controlled.  Denies any significant lightheadedness or dizziness.  She does have some mild low back pain which is minimal.  Some mild anxiety for which she will occasionally take buspirone.  History of DVT after leg injury.    Patient has been advised of split billing requirements and indicates understanding: Yes   Are you in the first 12 months of your Medicare coverage?  No    Healthy Habits:     In general, how would you rate your overall health?  Poor    Frequency of exercise:  None    Do you usually eat at least 4 servings of fruit and vegetables a day, include whole grains    & fiber and avoid regularly eating high fat or \"junk\" foods?  No    Taking medications regularly:  Yes    Medication side effects:  None    Ability to successfully perform activities of daily living:  Transportation requires assistance, shopping requires assistance, housework requires assistance and laundry requires assistance    Home Safety:  No safety concerns identified    Hearing Impairment:  Difficulty following a conversation in a noisy restaurant or crowded room, feel that people are mumbling or not speaking clearly and need to ask people to speak up or repeat themselves    In the past 6 months, have you been bothered by leaking of urine? Yes    In general, how would you rate your overall mental or emotional health?  Fair      PHQ-2 Total Score: 2    Additional concerns today:  No    Do you feel safe in your environment? Yes    Have you ever done Advance Care Planning? (For example, a Health Directive, POLST, or a discussion with a " medical provider or your loved ones about your wishes): Yes, patient states has an Advance Care Planning document and will bring a copy to the clinic.       Fall risk  Fallen 2 or more times in the past year?: No  Any fall with injury in the past year?: No    Cognitive Screening   1) Repeat 3 items (Leader, Season, Table)    2) Clock draw: NORMAL  3) 3 item recall: Recalls 3 objects  Results: NORMAL clock, 1-2 items recalled: COGNITIVE IMPAIRMENT LESS LIKELY    Mini-CogTM Copyright [employer]. Licensed by the author for use in [facility]; reprinted with permission (pedro@[facility]). All rights reserved.      Do you have sleep apnea, excessive snoring or daytime drowsiness?: no    Reviewed and updated as needed this visit by clinical staff  Tobacco  Allergies  Meds              Reviewed and updated as needed this visit by Provider                Social History     Tobacco Use     Smoking status: Never Smoker     Smokeless tobacco: Never Used   Substance Use Topics     Alcohol use: Yes     Comment: Alcoholic Drinks/day: rare     If you drink alcohol do you typically have >3 drinks per day or >7 drinks per week? No    Alcohol Use 11/5/2021   Prescreen: >3 drinks/day or >7 drinks/week? No       Current providers sharing in care for this patient include:   Patient Care Team:  Steve Puente MD as PCP - General (Internal Medicine)  Steve Puente MD as Assigned PCP    The following health maintenance items are reviewed in Epic and correct as of today:  Health Maintenance Due   Topic Date Due     ANNUAL REVIEW OF HM ORDERS  Never done     ADVANCE CARE PLANNING  Never done     DEPRESSION ACTION PLAN  Never done     ZOSTER IMMUNIZATION (1 of 2) Never done     DTAP/TDAP/TD IMMUNIZATION (1 - Tdap) 03/22/2007     INFLUENZA VACCINE (1) Never done         Pertinent mammograms are reviewed under the imaging tab.    Review of Systems  Constitutional, HEENT, cardiovascular, pulmonary, GI, , musculoskeletal, neuro,  "skin, endocrine and psych systems are negative, except as otherwise noted.    OBJECTIVE:   Wt 81.2 kg (179 lb)   BMI 29.79 kg/m   Estimated body mass index is 29.79 kg/m  as calculated from the following:    Height as of 5/10/19: 1.651 m (5' 5\").    Weight as of this encounter: 81.2 kg (179 lb).  Physical Exam  EYES: Eyelids, conjunctiva, and sclera were normal. Pupils were normal. Cornea, iris, and lens were normal bilaterally.  HEAD, EARS, NOSE, MOUTH, AND THROAT: Head and face were normal. Hearing was normal to voice and the ears were normal to external exam. Nose appearance was normal and there was no discharge. Oropharynx was normal.  NECK: Neck appearance was normal. There were no neck masses and the thyroid was not enlarged.  RESPIRATORY: Breathing pattern was normal and the chest moved symmetrically.  Percussion/auscultatory percussion was normal.  Lung sounds were normal and there were no abnormal sounds.  CARDIOVASCULAR: Heart rate and rhythm were normal.  S1 and S2 were normal and there were no extra sounds or murmurs. Peripheral pulses in arms and legs were normal.  Jugular venous pressure was normal.  There was no peripheral edema.  GASTROINTESTINAL: The abdomen was normal in contour.  Bowel sounds were present.  Percussion detected no organ enlargement or tenderness.  Palpation detected no tenderness, mass, or enlarged organs.   MUSCULOSKELETAL: Skeletal configuration was normal and muscle mass was normal for age. Joint appearance was overall normal.  LYMPHATIC: There were no enlarged nodes.  SKIN/HAIR/NAILS: Skin color was normal.  There were no skin lesions.  Hair and nails were normal.  NEUROLOGIC: The patient was alert and oriented to person, place, time, and circumstance. Speech was normal. Cranial nerves were normal. Motor strength was normal for age.  She has difficulty standing from a chair and walks with the aid of a walker.  She has an antalgic gait.  PSYCHIATRIC:  Mood and affect were " "normal and the patient had normal recent and remote memory. The patient's judgment and insight were normal.    ASSESSMENT / PLAN:   1. Annual physical exam  This is an 89-year-old woman.  Ongoing healthy lifestyle discussed and recommended.  Up-to-date on Covid vaccinations.  We will give influenza vaccine today.  I recommended shingles and tetanus vaccine at the pharmacy    2. TIA (transient ischemic attack)  We had a long discussion about secondary prevention today.  She is on an aspirin.  She does not want to add any new medications.  My preference would be that she take statin in place of aspirin.  This provides 3 times a reduction in stroke and heart her cardiac risk without the risk of bleeding.  - CBC with platelets; Future  - Comprehensive metabolic panel; Future  - Lipid panel reflex to direct LDL Fasting; Future  - atorvastatin (LIPITOR) 20 MG tablet; Take 1 tablet (20 mg) by mouth daily  Dispense: 90 tablet; Refill: 3    3. Primary hypertension  Controlled, discussed consideration of stopping lisinopril and at this time she seems to be tolerating well.  She will keep an eye on her symptoms of lightheadedness or dizziness    4. Mixed hyperlipidemia  As above  - TSH with free T4 reflex; Future    5. Poor balance  I recommended some balance training as here proximal leg muscles are quite weak  - Physical Therapy Referral; Future    6. Generalized anxiety disorder  Stable on buspirone as needed    7. Chronic bilateral low back pain without sciatica  Stop ibuprofen  - acetaminophen (TYLENOL) 650 MG CR tablet; Take 2 tablets (1,300 mg) by mouth every 8 hours as needed for mild pain    8. Vitamin D deficiency  - Vitamin D Deficiency; Future    9. Chronic deep vein thrombosis (DVT) of proximal vein of left lower extremity (H)  Remote, provoked    10. Nephrolithiasis  Stable    Estimated body mass index is 29.79 kg/m  as calculated from the following:    Height as of 5/10/19: 1.651 m (5' 5\").    Weight as of this " encounter: 81.2 kg (179 lb).      She reports that she has never smoked. She has never used smokeless tobacco.      Appropriate preventive services were discussed with this patient, including applicable screening as appropriate for cardiovascular disease, diabetes, osteopenia/osteoporosis, and glaucoma.  As appropriate for age/gender, discussed screening for colorectal cancer, prostate cancer, breast cancer, and cervical cancer. Checklist reviewing preventive services available has been given to the patient.    Reviewed patients plan of care and provided an AVS. The Basic Care Plan (routine screening as documented in Health Maintenance) for Ruth meets the Care Plan requirement. This Care Plan has been established and reviewed with the Patient.    Counseling Resources:  ATP IV Guidelines  Pooled Cohorts Equation Calculator  Breast Cancer Risk Calculator  Breast Cancer: Medication to Reduce Risk  FRAX Risk Assessment  ICSI Preventive Guidelines  Dietary Guidelines for Americans, 2010  USDA's MyPlate  ASA Prophylaxis  Lung CA Screening    Steve Puente MD  [facility]    Identified Health Risks:

## 2021-11-08 LAB — DEPRECATED CALCIDIOL+CALCIFEROL SERPL-MC: 10 UG/L (ref 30–80)

## 2021-11-08 RX ORDER — VITAMIN B COMPLEX
1 TABLET ORAL DAILY
Qty: 90 TABLET | Refills: 3 | Status: SHIPPED | OUTPATIENT
Start: 2021-11-08 | End: 2022-09-28

## 2021-12-13 ENCOUNTER — HOSPITAL ENCOUNTER (OUTPATIENT)
Dept: PHYSICAL THERAPY | Facility: REHABILITATION | Age: 86
End: 2021-12-13
Attending: INTERNAL MEDICINE
Payer: COMMERCIAL

## 2021-12-13 DIAGNOSIS — R26.89 POOR BALANCE: ICD-10-CM

## 2021-12-13 PROCEDURE — 97161 PT EVAL LOW COMPLEX 20 MIN: CPT | Mod: GP | Performed by: PHYSICAL THERAPIST

## 2021-12-13 PROCEDURE — 97110 THERAPEUTIC EXERCISES: CPT | Mod: GP | Performed by: PHYSICAL THERAPIST

## 2021-12-14 NOTE — PROGRESS NOTES
[facility]                                                                                   OUTPATIENT PHYSICAL THERAPY FUNCTIONAL EVALUATION  PLAN OF TREATMENT FOR OUTPATIENT REHABILITATION  (COMPLETE FOR INITIAL CLAIMS ONLY)  Patient's Last Name, First Name, M.I.  YOB: 1931  Ruth Mack     Provider's Name   [facility]   Medical Record No.  [id number]     Start of Care Date:  12/13/21   Onset Date:  06/14/21   Type:     _X__PT   ____OT  ____SLP Medical Diagnosis:  Poor balance     PT Diagnosis:  Impaired balance, generalized muscle weakness Visits from SOC:  1                              __________________________________________________________________________________  Plan of Treatment/Functional Goals:  balance training,bed mobility training,joint mobilization,neuromuscular re-education,ROM,strengthening,stretching,manual therapy           GOALS  HEP  Patient will be independent in HEP to address impairments and limitations in functional mobility/endurance.  01/24/22    Standing  Patient will be able to stand for >20 seocnds w/o UE support to demonstrate improvement in balance and ability to safely perform ADLs.  03/13/22    LEFS  Patient will achieve a score of 21 or greater (12/80 on eval) on The Lower Extremity Functional Scale to demonstrate a significant improvement in symptoms  03/13/22                                                           Therapy Frequency:  1 time/week   Predicted Duration of Therapy Intervention:  10-12 visits    Velvet Meredith, PT                                    I CERTIFY THE NEED FOR THESE SERVICES FURNISHED UNDER        THIS PLAN OF TREATMENT AND WHILE UNDER MY CARE     (Physician co-signature of this document indicates review and certification of the therapy plan).                Certification Date From:  12/13/21    Certification Date To:  03/13/22    Referring Provider:  Steve Puente MD    Initial Assessment  See Epic Evaluation- Start of Care Date: 12/13/21

## 2021-12-14 NOTE — PROGRESS NOTES
12/13/21 1300   Quick Adds   Quick Adds Certification   Type of Visit Initial OP PT Evaluation   General Information   Start of Care Date 12/13/21   Referring Physician Steve Puente MD   Orders Evaluate and Treat as Indicated   Order Date 11/05/21   Medical Diagnosis Poor balance   Onset of illness/injury or Date of Surgery 06/14/21   Surgical/Medical history reviewed Yes   Pertinent history of current problem (include personal factors and/or comorbidities that impact the POC) Patient reports she can get around without using the walker inside her home most of the time - has guard rails all around her house. Patient reports going up/down stairs everyday for normal household chores - patient reports the go down stairs backwards. Patient notes if they have something to lean on shes fine. Patient hasn't had a TIA since 2015 - when patient got her walker. Patient notes that she does not experience any dizziness or spinning symtpoms. Patient reports her sister lived to  and her exercise program really helped her last 5 years - wishes to get exercises to do the same.   Prior level of functional mobility Ambulation   Ambulation pt was been using walker for 6 years - post-TIA    Current Community Support Family/friend caregiver  (Family member usually visits at least once a day)   Patient role/Employment history Retired   Living environment [facility]   Home/Community Accessibility Comments Patient has guard rails all throughout her home which she uses frequently in addition to her walker to get around. Patient notes she goes up/down stairs everyday.    Current Assistive Devices Front Wheeled Walker   Patient/Family Goals Statement better balance   Fall Risk Screen   Fall screen completed by PT   Have you fallen 2 or more times in the past year? No   Have you fallen and had an injury in the past year? No   Is patient a fall risk? Yes   Fall screen comments TUG was not performed on evaluation due to time  constraints - will be performed at a follow up treatment   Abuse Screen (yes response referral indicated)   Feels Unsafe at Home or Work/School no   Feels Threatened by Someone no   Does Anyone Try to Keep You From Having Contact with Others or Doing Things Outside Your Home? no   Physical Signs of Abuse Present no   Functional Scales   Functional Scales and Outcomes LEFS - 12/80   Pain   Patient currently in pain Yes   Pain location Low back   Pain description comment Patient reports LBP as mild and mostly absent   Vitals Signs   Blood Pressure 122/70   Cognitive Status Examination   Orientation orientation to person, place and time   Level of Consciousness alert   Follows Commands and Answers Questions 100% of the time   Personal Safety and Judgment intact   Memory intact   Posture   Posture Kyphosis   Strength   Strength Comments Hip flexion 4-/5 B, hip IR/ER 4/5 B, knee extension 5/5 B, knee flexion 4+/5 B, DF 5/5 B, adduction: WNL, abduction: minor weakness noted in sitting   Gait   Gait Comments walks for 4WW, forward flexed gait, slow javier    Balance   Balance Comments Patient appears weak, unsteady, and very nervous standing w/o UE support.  Able to perform with 2WW in front - CBA - able to stand 5 seconds without support.    Balance Special Tests   Balance Special Tests Martinez balance  (1/2 Martinez balance performed - didn't have time to complete)   Balance Special Tests Martinez Balance   Score out of 56 To be determined   Comments Martinez was not completed due to time constraints but will be at a later date   Planned Therapy Interventions   Planned Therapy Interventions balance training;bed mobility training;joint mobilization;neuromuscular re-education;ROM;strengthening;stretching;manual therapy   Clinical Impression   Criteria for Skilled Therapeutic Interventions Met yes, treatment indicated   PT Diagnosis Impaired balance, generalized muscle weakness   Influenced by the following impairments Strength deficits  found in MMT and upon observation, poor balance with standing    Functional limitations due to impairments Transferring, standing, walking, typical ADLs   Clinical Presentation Stable/Uncomplicated   Clinical Presentation Rationale Unchanging   Clinical Decision Making (Complexity) Low complexity   Therapy Frequency 1 time/week   Predicted Duration of Therapy Intervention (days/wks) 10-12 visits   Risk & Benefits of therapy have been explained Yes   Patient, Family & other staff in agreement with plan of care Yes   Clinical Impression Comments Patient presents to the clinic with generalized muscle weakness and a heavily impaired sense of balance. Patient has been using a front wheeled walker ever since her TIA in 2015 and notes her balance has been progressively worsening. Patient notes she hasn't had any recent falls or close calls, however, she uses safety rails all throughout her home to get around. Patient can not stand more than 5 seconds without upper extremity support before falling over or losing her sense of balance. Patient is appropriate to continue with skilled PT intervention to address impairments and limitations in functional mobility/endurance.   GOALS   PT Eval Goals 1;2;3   Goal 1   Goal Identifier HEP   Goal Description Patient will be independent in HEP to address impairments and limitations in functional mobility/endurance.   Target Date 01/24/22   Goal 2   Goal Identifier Standing   Goal Description Patient will be able to stand for >20 seocnds w/o UE support to demonstrate improvement in balance and ability to safely perform ADLs.   Target Date 03/13/22   Goal 3   Goal Identifier LEFS   Goal Description Patient will achieve a score of 21 or greater (12/80 on eval) on The Lower Extremity Functional Scale to demonstrate a significant improvement in symptoms   Target Date 03/13/22   Total Evaluation Time   PT Jg Low Complexity Minutes (65271) 40   Therapy Certification   Certification date  from 12/13/21   Certification date to 03/13/22   Medical Diagnosis Poor balance   Certification I certify the need for these services furnished under this plan of treatment and while under my care.  (Physician co-signature of this document indicates review and certification of the therapy plan).

## 2022-01-13 ENCOUNTER — TRANSITIONAL CARE UNIT VISIT (OUTPATIENT)
Dept: GERIATRICS | Facility: CLINIC | Age: 87
End: 2022-01-13
Payer: COMMERCIAL

## 2022-01-13 ENCOUNTER — LAB REQUISITION (OUTPATIENT)
Dept: LAB | Facility: CLINIC | Age: 87
End: 2022-01-13
Payer: COMMERCIAL

## 2022-01-13 VITALS
RESPIRATION RATE: 18 BRPM | WEIGHT: 188 LBS | BODY MASS INDEX: 31.32 KG/M2 | HEART RATE: 72 BPM | TEMPERATURE: 98.8 F | HEIGHT: 65 IN | SYSTOLIC BLOOD PRESSURE: 129 MMHG | DIASTOLIC BLOOD PRESSURE: 69 MMHG | OXYGEN SATURATION: 93 %

## 2022-01-13 DIAGNOSIS — I82.509 CHRONIC DEEP VEIN THROMBOSIS (DVT) OF LOWER EXTREMITY, UNSPECIFIED LATERALITY, UNSPECIFIED VEIN (H): ICD-10-CM

## 2022-01-13 DIAGNOSIS — I10 ESSENTIAL HYPERTENSION: ICD-10-CM

## 2022-01-13 DIAGNOSIS — I48.91 UNSPECIFIED ATRIAL FIBRILLATION (H): ICD-10-CM

## 2022-01-13 DIAGNOSIS — R41.82 ALTERED MENTAL STATUS, UNSPECIFIED ALTERED MENTAL STATUS TYPE: ICD-10-CM

## 2022-01-13 DIAGNOSIS — A41.9 SEPSIS, DUE TO UNSPECIFIED ORGANISM, UNSPECIFIED WHETHER ACUTE ORGAN DYSFUNCTION PRESENT (H): Primary | ICD-10-CM

## 2022-01-13 DIAGNOSIS — D64.9 ANEMIA, UNSPECIFIED: ICD-10-CM

## 2022-01-13 PROCEDURE — 99305 1ST NF CARE MODERATE MDM 35: CPT | Performed by: FAMILY MEDICINE

## 2022-01-13 RX ORDER — POLYETHYLENE GLYCOL 3350 17 G/17G
1 POWDER, FOR SOLUTION ORAL DAILY PRN
COMMUNITY
End: 2022-11-25

## 2022-01-13 RX ORDER — SENNOSIDES A AND B 8.6 MG/1
2 TABLET, FILM COATED ORAL 2 TIMES DAILY PRN
COMMUNITY
End: 2022-11-25

## 2022-01-13 RX ORDER — OXYCODONE HYDROCHLORIDE 5 MG/1
2.5 TABLET ORAL EVERY 6 HOURS PRN
COMMUNITY
End: 2022-03-10

## 2022-01-13 RX ORDER — AMOXICILLIN 500 MG/1
500 CAPSULE ORAL 3 TIMES DAILY
COMMUNITY
Start: 2021-01-11 | End: 2022-01-18

## 2022-01-13 RX ORDER — LANOLIN ALCOHOL/MO/W.PET/CERES
1 CREAM (GRAM) TOPICAL
COMMUNITY
End: 2022-11-25

## 2022-01-13 RX ORDER — ERYTHROMYCIN 5 MG/G
0.5 OINTMENT OPHTHALMIC AT BEDTIME
COMMUNITY
End: 2023-08-09

## 2022-01-13 ASSESSMENT — MIFFLIN-ST. JEOR: SCORE: 1273.64

## 2022-01-13 NOTE — LETTER
1/13/2022        RE: Ruth Mack  [address] [place] [address]        M [facility] GERIATRIC SERVICES    Facility:  [facility] ([facility]) [[id number]]  Code Status: FULL CODE      CHIEF COMPLAINT/REASON FOR VISIT:  Chief Complaint   Patient presents with     Hospital F/U       HPI:   Ruth is a 89 year old female who was recently admitted to the hospital 1/7/2021.  Her signs and symptoms were weakness speech changes confusion.  I am unsure of his baseline at this time but her body temperature was low at this time and she had low blood pressures.  Elevated white count with an altered mental status.  Case catheter was placed in the ED and patient was started on IV cefepime initially and urine cultures grew out E. coli with good sensitivities.  She did transition to oral Augmentin.  COVID was negative at that at that time and overall her conditions did improve.  She was treated appropriately and transferred here to the TCU at [facility] in stable condition.    Patient is lying in bed she says she feels weak.  She is nonspecific with this and has no pain issues.  She is moving her bowels and urinating without any urgency frequency or burning and her appetite is not good.  She denies any fevers chills nausea vomiting diarrhea change in vision hearing taste or smell weakness one-sided.    Past Medical History:  Past Medical History:   Diagnosis Date     Deep vein thrombosis (H)      Generalized anxiety disorder      Hyperlipidemia      Hypertension      Kidney stone      Nephrolithiasis      Obesity      TIA (transient ischemic attack)            Surgical History:  Past Surgical History:   Procedure Laterality Date     CATARACT EXTRACTION Bilateral      SHOULDER SURGERY Left        Family History:   Family History   Problem Relation Age of Onset     Cancer Mother      Heart Disease Mother      Heart Disease Father      Cancer Sister      Cancer Daughter         breast     No Known Problems Son       No Known Problems Son      No Known Problems Son        Social History:    Social History     Socioeconomic History     Marital status:      Spouse name: Not on file     Number of children: Not on file     Years of education: Not on file     Highest education level: Not on file   Occupational History     Not on file   Tobacco Use     Smoking status: Never Smoker     Smokeless tobacco: Never Used   Substance and Sexual Activity     Alcohol use: Yes     Comment: Alcoholic Drinks/day: rare     Drug use: No     Sexual activity: Not on file   Other Topics Concern     Not on file   Social History Narrative    Lives alone.  Retired .  Four children. Igor Murphy Kathy and Kelvin.     Social Determinants of Health     Financial Resource Strain: Not on file   Food Insecurity: Not on file   Transportation Needs: Not on file   Physical Activity: Not on file   Stress: Not on file   Social Connections: Not on file   Intimate Partner Violence: Not on file   Housing Stability: Not on file       Post Discharge Medication Reconciliation Status: discharge medications reconciled and changed, per note/orders    Current Outpatient Medications   Medication Sig     acetaminophen (TYLENOL) 650 MG CR tablet Take 2 tablets (1,300 mg) by mouth every 8 hours as needed for mild pain     amoxicillin (AMOXIL) 500 MG capsule Take 500 mg by mouth 3 times daily     atorvastatin (LIPITOR) 20 MG tablet Take 1 tablet (20 mg) by mouth daily     busPIRone (BUSPAR) 15 MG tablet Take 1 tablet (15 mg) by mouth daily as needed     erythromycin (ROMYCIN) 5 MG/GM ophthalmic ointment Place 0.5 inches into both eyes At Bedtime     melatonin 3 MG tablet Take 1 mg by mouth nightly as needed for sleep     metoprolol tartrate (LOPRESSOR) 25 MG tablet Take 1 tablet (25 mg) by mouth 2 times daily     oxyCODONE (ROXICODONE) 5 MG tablet Take 2.5 mg by mouth every 6 hours as needed for severe pain     polyethyl glycol-propyl glycol 0.4-0.3 %  Place 1 drop into both eyes every 2 hours as needed     polyethylene glycol (MIRALAX) 17 g packet Take 1 packet by mouth daily as needed for constipation     senna (SENOKOT) 8.6 MG tablet Take 2 tablets by mouth 2 times daily as needed for constipation     Vitamin D3 (CHOLECALCIFEROL) 25 mcg (1000 units) tablet Take 1 tablet (25 mcg) by mouth daily     No current facility-administered medications for this visit.       REVIEW OF SYSTEM: Positive for feeling weak but denies any specific review of systems including fevers chills nausea vomit diarrhea change in vision hearing taste or smell weakness one-sided chest pain shortness of breath.  Remainder the review of systems is negative.      PHYSICAL EXAM: Patient is alert pleasant does not appear to be any acute distress she does answer questions appropriately but she is hard of hearing.  Head was normocephalic and atraumatic sclera conjunctiva is clear oromucosa was moist nasal discharge.  Heart sounds were distant but regular at this time lungs showed occasional rhonchi but no crackles or rales.  Extremities showed only trace edema bilateral.  And neurologic Jair was nonfocal and affect was pleasant.        LABS: Vitals; blood pressure 123/65    Pulse of 70    Temperature is 98.8    Respirations 18    O2 sats 93%.      ASSESSMENT:    Encounter Diagnoses   Name Primary?     Sepsis, due to unspecified organism, unspecified whether acute organ dysfunction present (H) Yes     Chronic deep vein thrombosis (DVT) of lower extremity, unspecified laterality, unspecified vein (H)      Essential hypertension      Altered mental status, unspecified altered mental status type         PLAN: Plan at this time we will get hospital labs and downloaded to point click care I do not have those available to me at this time.    Nothing was fine on physical exam to warrant her severe weakness however we will get a basic metabolic profile tomorrow and a CBC.  With the labs from the hospital  hopefully I will be able to make a comparison at this time and I will continue to monitor above medical problems at this time we will continue with physical and occupational therapy at this time.        Electronically signed by: CACHORRO SR DO            Sincerely,        CACHORRO SR DO

## 2022-01-13 NOTE — PROGRESS NOTES
[facility] GERIATRIC SERVICES    Facility:  [facility] ([facility]) [[id number]]  Code Status: FULL CODE      CHIEF COMPLAINT/REASON FOR VISIT:  Chief Complaint   Patient presents with     Hospital F/U       HPI:   Ruth is a 89 year old female who was recently admitted to the hospital 1/7/2021.  Her signs and symptoms were weakness speech changes confusion.  I am unsure of his baseline at this time but her body temperature was low at this time and she had low blood pressures.  Elevated white count with an altered mental status.  Case catheter was placed in the ED and patient was started on IV cefepime initially and urine cultures grew out E. coli with good sensitivities.  She did transition to oral Augmentin.  COVID was negative at that at that time and overall her conditions did improve.  She was treated appropriately and transferred here to the TCU at [facility] in stable condition.    Patient is lying in bed she says she feels weak.  She is nonspecific with this and has no pain issues.  She is moving her bowels and urinating without any urgency frequency or burning and her appetite is not good.  She denies any fevers chills nausea vomiting diarrhea change in vision hearing taste or smell weakness one-sided.    Past Medical History:  Past Medical History:   Diagnosis Date     Deep vein thrombosis (H)      Generalized anxiety disorder      Hyperlipidemia      Hypertension      Kidney stone      Nephrolithiasis      Obesity      TIA (transient ischemic attack)            Surgical History:  Past Surgical History:   Procedure Laterality Date     CATARACT EXTRACTION Bilateral      SHOULDER SURGERY Left        Family History:   Family History   Problem Relation Age of Onset     Cancer Mother      Heart Disease Mother      Heart Disease Father      Cancer Sister      Cancer Daughter         breast     No Known Problems Son      No Known Problems Son      No Known Problems Son        Social History:    Social  History     Socioeconomic History     Marital status:      Spouse name: Not on file     Number of children: Not on file     Years of education: Not on file     Highest education level: Not on file   Occupational History     Not on file   Tobacco Use     Smoking status: Never Smoker     Smokeless tobacco: Never Used   Substance and Sexual Activity     Alcohol use: Yes     Comment: Alcoholic Drinks/day: rare     Drug use: No     Sexual activity: Not on file   Other Topics Concern     Not on file   Social History Narrative    Lives alone.  Retired .  Four children. Igor Murphy Kathy and Kelvin.     Social Determinants of Health     Financial Resource Strain: Not on file   Food Insecurity: Not on file   Transportation Needs: Not on file   Physical Activity: Not on file   Stress: Not on file   Social Connections: Not on file   Intimate Partner Violence: Not on file   Housing Stability: Not on file       Post Discharge Medication Reconciliation Status: discharge medications reconciled and changed, per note/orders    Current Outpatient Medications   Medication Sig     acetaminophen (TYLENOL) 650 MG CR tablet Take 2 tablets (1,300 mg) by mouth every 8 hours as needed for mild pain     amoxicillin (AMOXIL) 500 MG capsule Take 500 mg by mouth 3 times daily     atorvastatin (LIPITOR) 20 MG tablet Take 1 tablet (20 mg) by mouth daily     busPIRone (BUSPAR) 15 MG tablet Take 1 tablet (15 mg) by mouth daily as needed     erythromycin (ROMYCIN) 5 MG/GM ophthalmic ointment Place 0.5 inches into both eyes At Bedtime     melatonin 3 MG tablet Take 1 mg by mouth nightly as needed for sleep     metoprolol tartrate (LOPRESSOR) 25 MG tablet Take 1 tablet (25 mg) by mouth 2 times daily     oxyCODONE (ROXICODONE) 5 MG tablet Take 2.5 mg by mouth every 6 hours as needed for severe pain     polyethyl glycol-propyl glycol 0.4-0.3 % Place 1 drop into both eyes every 2 hours as needed     polyethylene glycol (MIRALAX) 17 g  packet Take 1 packet by mouth daily as needed for constipation     senna (SENOKOT) 8.6 MG tablet Take 2 tablets by mouth 2 times daily as needed for constipation     Vitamin D3 (CHOLECALCIFEROL) 25 mcg (1000 units) tablet Take 1 tablet (25 mcg) by mouth daily     No current facility-administered medications for this visit.       REVIEW OF SYSTEM: Positive for feeling weak but denies any specific review of systems including fevers chills nausea vomit diarrhea change in vision hearing taste or smell weakness one-sided chest pain shortness of breath.  Remainder the review of systems is negative.      PHYSICAL EXAM: Patient is alert pleasant does not appear to be any acute distress she does answer questions appropriately but she is hard of hearing.  Head was normocephalic and atraumatic sclera conjunctiva is clear oromucosa was moist nasal discharge.  Heart sounds were distant but regular at this time lungs showed occasional rhonchi but no crackles or rales.  Extremities showed only trace edema bilateral.  And neurologic Jair was nonfocal and affect was pleasant.        LABS: Vitals; blood pressure 123/65    Pulse of 70    Temperature is 98.8    Respirations 18    O2 sats 93%.      ASSESSMENT:    Encounter Diagnoses   Name Primary?     Sepsis, due to unspecified organism, unspecified whether acute organ dysfunction present (H) Yes     Chronic deep vein thrombosis (DVT) of lower extremity, unspecified laterality, unspecified vein (H)      Essential hypertension      Altered mental status, unspecified altered mental status type         PLAN: Plan at this time we will get hospital labs and downloaded to point click care I do not have those available to me at this time.    Nothing was fine on physical exam to warrant her severe weakness however we will get a basic metabolic profile tomorrow and a CBC.  With the labs from the hospital hopefully I will be able to make a comparison at this time and I will continue to monitor  above medical problems at this time we will continue with physical and occupational therapy at this time.        Electronically signed by: CACHORRO SR DO

## 2022-01-14 LAB
ANION GAP SERPL CALCULATED.3IONS-SCNC: 9 MMOL/L (ref 5–18)
BUN SERPL-MCNC: 19 MG/DL (ref 8–28)
CALCIUM SERPL-MCNC: 9.3 MG/DL (ref 8.5–10.5)
CHLORIDE BLD-SCNC: 104 MMOL/L (ref 98–107)
CO2 SERPL-SCNC: 29 MMOL/L (ref 22–31)
CREAT SERPL-MCNC: 0.6 MG/DL (ref 0.6–1.1)
ERYTHROCYTE [DISTWIDTH] IN BLOOD BY AUTOMATED COUNT: 14.6 % (ref 10–15)
GFR SERPL CREATININE-BSD FRML MDRD: 85 ML/MIN/1.73M2
GLUCOSE BLD-MCNC: 103 MG/DL (ref 70–125)
HCT VFR BLD AUTO: 40 % (ref 35–47)
HGB BLD-MCNC: 12.9 G/DL (ref 11.7–15.7)
MCH RBC QN AUTO: 30.2 PG (ref 26.5–33)
MCHC RBC AUTO-ENTMCNC: 32.3 G/DL (ref 31.5–36.5)
MCV RBC AUTO: 94 FL (ref 78–100)
PLATELET # BLD AUTO: 179 10E3/UL (ref 150–450)
POTASSIUM BLD-SCNC: 3.9 MMOL/L (ref 3.5–5)
RBC # BLD AUTO: 4.27 10E6/UL (ref 3.8–5.2)
SODIUM SERPL-SCNC: 142 MMOL/L (ref 136–145)
WBC # BLD AUTO: 4.5 10E3/UL (ref 4–11)

## 2022-01-14 PROCEDURE — P9603 ONE-WAY ALLOW PRORATED MILES: HCPCS | Performed by: FAMILY MEDICINE

## 2022-01-14 PROCEDURE — 36415 COLL VENOUS BLD VENIPUNCTURE: CPT | Performed by: FAMILY MEDICINE

## 2022-01-14 PROCEDURE — 85027 COMPLETE CBC AUTOMATED: CPT | Performed by: FAMILY MEDICINE

## 2022-01-14 PROCEDURE — 80048 BASIC METABOLIC PNL TOTAL CA: CPT | Performed by: FAMILY MEDICINE

## 2022-01-18 ENCOUNTER — TRANSITIONAL CARE UNIT VISIT (OUTPATIENT)
Dept: GERIATRICS | Facility: CLINIC | Age: 87
End: 2022-01-18
Payer: COMMERCIAL

## 2022-01-18 VITALS
TEMPERATURE: 97.6 F | SYSTOLIC BLOOD PRESSURE: 118 MMHG | DIASTOLIC BLOOD PRESSURE: 64 MMHG | RESPIRATION RATE: 16 BRPM | HEIGHT: 65 IN | BODY MASS INDEX: 31.32 KG/M2 | OXYGEN SATURATION: 93 % | WEIGHT: 188 LBS | HEART RATE: 59 BPM

## 2022-01-18 DIAGNOSIS — R41.82 ALTERED MENTAL STATUS, UNSPECIFIED ALTERED MENTAL STATUS TYPE: ICD-10-CM

## 2022-01-18 DIAGNOSIS — I10 ESSENTIAL HYPERTENSION: ICD-10-CM

## 2022-01-18 DIAGNOSIS — A41.9 SEPSIS, DUE TO UNSPECIFIED ORGANISM, UNSPECIFIED WHETHER ACUTE ORGAN DYSFUNCTION PRESENT (H): Primary | ICD-10-CM

## 2022-01-18 DIAGNOSIS — I82.509 CHRONIC DEEP VEIN THROMBOSIS (DVT) OF LOWER EXTREMITY, UNSPECIFIED LATERALITY, UNSPECIFIED VEIN (H): ICD-10-CM

## 2022-01-18 PROCEDURE — 99309 SBSQ NF CARE MODERATE MDM 30: CPT | Performed by: FAMILY MEDICINE

## 2022-01-18 ASSESSMENT — MIFFLIN-ST. JEOR: SCORE: 1273.64

## 2022-01-18 NOTE — LETTER
1/18/2022        RE: Ruth Mack  [address] [place] [address]        M [facility] GERIATRIC SERVICES    Facility:  [facility] ([facility]) [[id number]]  Code Status: FULL CODE      CHIEF COMPLAINT/REASON FOR VISIT:  Chief Complaint   Patient presents with     RECHECK       HISTORY:      HPI: Ruth is a 89 year old female [facility] and occupational therapy and medical management for multiple medical problems she was recently hospitalized for sepsis at this time and was treated with antibiotics.  She is doing well at this time and has no concerns.  Is been no signs of infection at this time and she will complete her antibiotics soon.  She denies any issues at this time.    Staff have no new concerns.    Past Medical History:   Diagnosis Date     Deep vein thrombosis (H)      Generalized anxiety disorder      Hyperlipidemia      Hypertension      Kidney stone      Nephrolithiasis      Obesity      TIA (transient ischemic attack)              Family History   Problem Relation Age of Onset     Cancer Mother      Heart Disease Mother      Heart Disease Father      Cancer Sister      Cancer Daughter         breast     No Known Problems Son      No Known Problems Son      No Known Problems Son      Social History     Socioeconomic History     Marital status:      Spouse name: Not on file     Number of children: Not on file     Years of education: Not on file     Highest education level: Not on file   Occupational History     Not on file   Tobacco Use     Smoking status: Never Smoker     Smokeless tobacco: Never Used   Substance and Sexual Activity     Alcohol use: Yes     Comment: Alcoholic Drinks/day: rare     Drug use: No     Sexual activity: Not on file   Other Topics Concern     Not on file   Social History Narrative    Lives alone.  Retired .  Four children. Igor Murphy Kathy and Ken.     Social Determinants of Health      Financial Resource Strain: Not on file   Food Insecurity: Not on file   Transportation Needs: Not on file   Physical Activity: Not on file   Stress: Not on file   Social Connections: Not on file   Intimate Partner Violence: Not on file   Housing Stability: Not on file         REVIEW OF SYSTEM: At this time patient denies any pain fevers chills nausea vomit diarrhea change in vision hearing taste or smell weakness one-sided chest pain shortness of breath.  She has no urgency frequency or burning with urination and she is moving her bowels well with help and the remainder review of systems is negative.      PHYSICAL EXAM: Patient is alert pleasant does not appear to be in acute distress head is normocephalic and atraumatic sclera conjunctive is clear oromucosa was moist nasal discharge.  Heart sounds were distant but sound regular at this time lungs are clear to auscultation extremities not show any cyanosis or edema neurologic Jair was nonfocal and affect was pleasant.        LABS: 01/14/2021 2 white count was 4.5, hemoglobin is 12.9, hematocrit was 40.0, platelets 279,000.    Basic metabolic profile; sodium is 142, potassium 3.9, chloride 104, CO2 is 29, anion gap was 9, BUN was 19, creatinine 0.6, calcium was 9.3, glucose 103, GFR was 85.    Vitals; blood pressure 118/64    Pulse is 59    Temperature is 97.6    Respiration 16    O2 sats 93%.      ASSESSMENT:   Encounter Diagnoses   Name Primary?     Sepsis, due to unspecified organism, unspecified whether acute organ dysfunction present (H) Yes     Chronic deep vein thrombosis (DVT) of lower extremity, unspecified laterality, unspecified vein (H)      Essential hypertension      Altered mental status, unspecified altered mental status type         PLAN: Plan at this time we will continue to monitor above medical problems and no other changes to care plan at this time.  Care plan was reviewed and is appropriate.    Will continue with PT OT and will continue to  monitor patient while she is here in the TCU.        Electronically signed by: CACHORRO SR DO          Sincerely,        CACHORRO SR DO

## 2022-01-18 NOTE — PROGRESS NOTES
[facility] GERIATRIC SERVICES    Facility:  [facility] ([facility]) [[id number]]  Code Status: FULL CODE      CHIEF COMPLAINT/REASON FOR VISIT:  Chief Complaint   Patient presents with     RECHECK       HISTORY:      HPI: Ruth is a 89 year old female who [facility] undergoing physical and occupational therapy and medical management for multiple medical problems she was recently hospitalized for sepsis at this time and was treated with antibiotics.  She is doing well at this time and has no concerns.  Is been no signs of infection at this time and she will complete her antibiotics soon.  She denies any issues at this time.    Staff have no new concerns.    Past Medical History:   Diagnosis Date     Deep vein thrombosis (H)      Generalized anxiety disorder      Hyperlipidemia      Hypertension      Kidney stone      Nephrolithiasis      Obesity      TIA (transient ischemic attack)              Family History   Problem Relation Age of Onset     Cancer Mother      Heart Disease Mother      Heart Disease Father      Cancer Sister      Cancer Daughter         breast     No Known Problems Son      No Known Problems Son      No Known Problems Son      Social History     Socioeconomic History     Marital status:      Spouse name: Not on file     Number of children: Not on file     Years of education: Not on file     Highest education level: Not on file   Occupational History     Not on file   Tobacco Use     Smoking status: Never Smoker     Smokeless tobacco: Never Used   Substance and Sexual Activity     Alcohol use: Yes     Comment: Alcoholic Drinks/day: rare     Drug use: No     Sexual activity: Not on file   Other Topics Concern     Not on file   Social History Narrative    Lives alone.  Retired .  Four children. Igor Murphy Kathy and Ken.     Social Determinants of Health     Financial Resource Strain: Not on file   Food Insecurity: Not on file   Transportation  Needs: Not on file   Physical Activity: Not on file   Stress: Not on file   Social Connections: Not on file   Intimate Partner Violence: Not on file   Housing Stability: Not on file         REVIEW OF SYSTEM: At this time patient denies any pain fevers chills nausea vomit diarrhea change in vision hearing taste or smell weakness one-sided chest pain shortness of breath.  She has no urgency frequency or burning with urination and she is moving her bowels well with help and the remainder review of systems is negative.      PHYSICAL EXAM: Patient is alert pleasant does not appear to be in acute distress head is normocephalic and atraumatic sclera conjunctive is clear oromucosa was moist nasal discharge.  Heart sounds were distant but sound regular at this time lungs are clear to auscultation extremities not show any cyanosis or edema neurologic Jair was nonfocal and affect was pleasant.        LABS: 01/14/2021 2 white count was 4.5, hemoglobin is 12.9, hematocrit was 40.0, platelets 279,000.    Basic metabolic profile; sodium is 142, potassium 3.9, chloride 104, CO2 is 29, anion gap was 9, BUN was 19, creatinine 0.6, calcium was 9.3, glucose 103, GFR was 85.    Vitals; blood pressure 118/64    Pulse is 59    Temperature is 97.6    Respiration 16    O2 sats 93%.      ASSESSMENT:   Encounter Diagnoses   Name Primary?     Sepsis, due to unspecified organism, unspecified whether acute organ dysfunction present (H) Yes     Chronic deep vein thrombosis (DVT) of lower extremity, unspecified laterality, unspecified vein (H)      Essential hypertension      Altered mental status, unspecified altered mental status type         PLAN: Plan at this time we will continue to monitor above medical problems and no other changes to care plan at this time.  Care plan was reviewed and is appropriate.    Will continue with PT OT and will continue to monitor patient while she is here in the TCU.        Electronically signed by: CACHORRO   DO REMBERTO

## 2022-01-20 ENCOUNTER — TRANSITIONAL CARE UNIT VISIT (OUTPATIENT)
Dept: GERIATRICS | Facility: CLINIC | Age: 87
End: 2022-01-20
Payer: COMMERCIAL

## 2022-01-20 VITALS
SYSTOLIC BLOOD PRESSURE: 126 MMHG | BODY MASS INDEX: 31.32 KG/M2 | HEART RATE: 68 BPM | WEIGHT: 188 LBS | RESPIRATION RATE: 18 BRPM | HEIGHT: 65 IN | TEMPERATURE: 97.8 F | OXYGEN SATURATION: 94 % | DIASTOLIC BLOOD PRESSURE: 67 MMHG

## 2022-01-20 DIAGNOSIS — R41.82 ALTERED MENTAL STATUS, UNSPECIFIED ALTERED MENTAL STATUS TYPE: ICD-10-CM

## 2022-01-20 DIAGNOSIS — I82.509 CHRONIC DEEP VEIN THROMBOSIS (DVT) OF LOWER EXTREMITY, UNSPECIFIED LATERALITY, UNSPECIFIED VEIN (H): ICD-10-CM

## 2022-01-20 DIAGNOSIS — I10 ESSENTIAL HYPERTENSION: ICD-10-CM

## 2022-01-20 DIAGNOSIS — A41.9 SEPSIS, DUE TO UNSPECIFIED ORGANISM, UNSPECIFIED WHETHER ACUTE ORGAN DYSFUNCTION PRESENT (H): Primary | ICD-10-CM

## 2022-01-20 PROCEDURE — 99309 SBSQ NF CARE MODERATE MDM 30: CPT | Performed by: FAMILY MEDICINE

## 2022-01-20 ASSESSMENT — MIFFLIN-ST. JEOR: SCORE: 1273.64

## 2022-01-20 NOTE — PROGRESS NOTES
[facility] GERIATRIC SERVICES    Facility:  [facility] ([facility]) [[id number]]  Code Status: FULL CODE      CHIEF COMPLAINT/REASON FOR VISIT:  Chief Complaint   Patient presents with     RECHECK       HISTORY:      HPI: Ruth is a 89 year old female who resides here at the TCU at [facility].  She did have a hospitalization for sepsis and has finished antibiotics.  There are no concerns at this time and she is still recovering still somewhat weak at this time but is still ongoing with physical and Occupational Therapy.  There is been no signs of infection since she has been here and she denies any problems at this time.    Past Medical History:   Diagnosis Date     Deep vein thrombosis (H)      Generalized anxiety disorder      Hyperlipidemia      Hypertension      Kidney stone      Nephrolithiasis      Obesity      TIA (transient ischemic attack)              Family History   Problem Relation Age of Onset     Cancer Mother      Heart Disease Mother      Heart Disease Father      Cancer Sister      Cancer Daughter         breast     No Known Problems Son      No Known Problems Son      No Known Problems Son      Social History     Socioeconomic History     Marital status:      Spouse name: Not on file     Number of children: Not on file     Years of education: Not on file     Highest education level: Not on file   Occupational History     Not on file   Tobacco Use     Smoking status: Never Smoker     Smokeless tobacco: Never Used   Substance and Sexual Activity     Alcohol use: Yes     Comment: Alcoholic Drinks/day: rare     Drug use: No     Sexual activity: Not on file   Other Topics Concern     Not on file   Social History Narrative    Lives alone.  Retired .  Four children. Igor Murphy Kathy and Ken.     Social Determinants of Health     Financial Resource Strain: Not on file   Food Insecurity: Not on file   Transportation Needs: Not on file   Physical Activity: Not on  file   Stress: Not on file   Social Connections: Not on file   Intimate Partner Violence: Not on file   Housing Stability: Not on file         REVIEW OF SYSTEM: Patient denies any pain fevers chills nausea vomit diarrhea change in vision hearing taste or smell weakness one-sided the chest pain shows of breath.  Denies incontinent of stool polyphagia polydipsia polyuria depression or anxiety and the remainder review of systems is negative.      PHYSICAL EXAM: Patient is alert pleasant does not appear to be in acute distress head is normocephalic and atraumatic sclera conjunctiva was clear oromucosa was moist nose with discharge.  Heart sounds are regular at this time lungs are clear to auscultation abdomen was obese nontender.  Extremities show just trace edema bilateral.  Neurologic seems nonfocal and affect was pleasant.        LABS: 01/14/2022 white count was 4.5, hemoglobin is 12.9, hematocrit is 40.0, platelets 179,000.    Basic metabolic profile; sodium is 142, potassium 3.9, chloride 104, CO2 is 29, anion gap was 9, BUN is 19, creatinine 0.6, calcium 9.3, glucose 103, GFR was 85.    Vital; blood pressure 120/62    Pulse of 60    Temperature is 97.8    Respiration 17    O2 sats 97%.      ASSESSMENT:   Encounter Diagnoses   Name Primary?     Sepsis, due to unspecified organism, unspecified whether acute organ dysfunction present (H) Yes     Essential hypertension      Chronic deep vein thrombosis (DVT) of lower extremity, unspecified laterality, unspecified vein (H)      Altered mental status, unspecified altered mental status type         PLAN: Plan at this time we will continue with physical and Occupational Therapy no new changes to care plan at this time.  Care plan was reviewed and is appropriate.            Electronically signed by: CACHORRO SR DO

## 2022-01-20 NOTE — LETTER
1/20/2022        RE: Ruth Mack  [address] [place] [address]        [facility] GERIATRIC SERVICES    Facility:  [facility] ([facility]) [[id number]]  Code Status: FULL CODE      CHIEF COMPLAINT/REASON FOR VISIT:  Chief Complaint   Patient presents with     RECHECK       HISTORY:      HPI: Ruth is a 89 year old female who resides here at the TCU at [facility].  She did have a hospitalization for sepsis and has finished antibiotics.  There are no concerns at this time and she is still recovering still somewhat weak at this time but is still ongoing with physical and Occupational Therapy.  There is been no signs of infection since she has been here and she denies any problems at this time.    Past Medical History:   Diagnosis Date     Deep vein thrombosis (H)      Generalized anxiety disorder      Hyperlipidemia      Hypertension      Kidney stone      Nephrolithiasis      Obesity      TIA (transient ischemic attack)              Family History   Problem Relation Age of Onset     Cancer Mother      Heart Disease Mother      Heart Disease Father      Cancer Sister      Cancer Daughter         breast     No Known Problems Son      No Known Problems Son      No Known Problems Son      Social History     Socioeconomic History     Marital status:      Spouse name: Not on file     Number of children: Not on file     Years of education: Not on file     Highest education level: Not on file   Occupational History     Not on file   Tobacco Use     Smoking status: Never Smoker     Smokeless tobacco: Never Used   Substance and Sexual Activity     Alcohol use: Yes     Comment: Alcoholic Drinks/day: rare     Drug use: No     Sexual activity: Not on file   Other Topics Concern     Not on file   Social History Narrative    Lives alone.  Retired .  Four children. Igor Murphy Kathy and Kelvin.     Social Determinants of Health     Financial Resource Strain: Not on file   Food  Insecurity: Not on file   Transportation Needs: Not on file   Physical Activity: Not on file   Stress: Not on file   Social Connections: Not on file   Intimate Partner Violence: Not on file   Housing Stability: Not on file         REVIEW OF SYSTEM: Patient denies any pain fevers chills nausea vomit diarrhea change in vision hearing taste or smell weakness one-sided the chest pain shows of breath.  Denies incontinent of stool polyphagia polydipsia polyuria depression or anxiety and the remainder review of systems is negative.      PHYSICAL EXAM: Patient is alert pleasant does not appear to be in acute distress head is normocephalic and atraumatic sclera conjunctiva was clear oromucosa was moist nose with discharge.  Heart sounds are regular at this time lungs are clear to auscultation abdomen was obese nontender.  Extremities show just trace edema bilateral.  Neurologic seems nonfocal and affect was pleasant.        LABS: 01/14/2022 white count was 4.5, hemoglobin is 12.9, hematocrit is 40.0, platelets 179,000.    Basic metabolic profile; sodium is 142, potassium 3.9, chloride 104, CO2 is 29, anion gap was 9, BUN is 19, creatinine 0.6, calcium 9.3, glucose 103, GFR was 85.    Vital; blood pressure 120/62    Pulse of 60    Temperature is 97.8    Respiration 17    O2 sats 97%.      ASSESSMENT:   Encounter Diagnoses   Name Primary?     Sepsis, due to unspecified organism, unspecified whether acute organ dysfunction present (H) Yes     Essential hypertension      Chronic deep vein thrombosis (DVT) of lower extremity, unspecified laterality, unspecified vein (H)      Altered mental status, unspecified altered mental status type         PLAN: Plan at this time we will continue with physical and Occupational Therapy no new changes to care plan at this time.  Care plan was reviewed and is appropriate.            Electronically signed by: CACHORRO SR DO          Sincerely,        CACHORRO SR DO

## 2022-01-25 ENCOUNTER — TRANSITIONAL CARE UNIT VISIT (OUTPATIENT)
Dept: GERIATRICS | Facility: CLINIC | Age: 87
End: 2022-01-25
Payer: COMMERCIAL

## 2022-01-25 VITALS
RESPIRATION RATE: 16 BRPM | DIASTOLIC BLOOD PRESSURE: 61 MMHG | HEIGHT: 65 IN | HEART RATE: 72 BPM | OXYGEN SATURATION: 96 % | WEIGHT: 188 LBS | TEMPERATURE: 97.4 F | BODY MASS INDEX: 31.32 KG/M2 | SYSTOLIC BLOOD PRESSURE: 106 MMHG

## 2022-01-25 DIAGNOSIS — A41.9 SEPSIS, DUE TO UNSPECIFIED ORGANISM, UNSPECIFIED WHETHER ACUTE ORGAN DYSFUNCTION PRESENT (H): Primary | ICD-10-CM

## 2022-01-25 DIAGNOSIS — I10 ESSENTIAL HYPERTENSION: ICD-10-CM

## 2022-01-25 DIAGNOSIS — I82.509 CHRONIC DEEP VEIN THROMBOSIS (DVT) OF LOWER EXTREMITY, UNSPECIFIED LATERALITY, UNSPECIFIED VEIN (H): ICD-10-CM

## 2022-01-25 PROCEDURE — 99309 SBSQ NF CARE MODERATE MDM 30: CPT | Performed by: FAMILY MEDICINE

## 2022-01-25 ASSESSMENT — MIFFLIN-ST. JEOR: SCORE: 1273.64

## 2022-01-25 NOTE — LETTER
1/25/2022        RE: Ruth Mack  [address] [place] [address]        [facility] GERIATRIC SERVICES    Facility:  [facility] ([facility]) [[id number]]  Code Status: FULL CODE      CHIEF COMPLAINT/REASON FOR VISIT:  Chief Complaint   Patient presents with     RECHECK       HISTORY:      HPI: Ruth is a 89 year old female who resides here in the TCU at [facility] secondary to hospitalization for sepsis.  She is finished her antibiotics and there is no signs of any recurrent infection.  She is progressing slowly with physical and Occupational Therapy but as anticipated and she still feels somewhat weak according to last note.  At that time she did deny any issues.    Her eyelid around her eye and around her eye is reddened at this time and she scratching it she does not believe she is getting her eyedrops at this time and she supposed to have him every 2 hours as needed for dry eyes.  And she also also on erythromycin ointment at bedtime.  And I'm going to schedule her other ointment 1 4 times a day and every 2 hours as needed.  She has not get this enough and it is affecting her comfort.  She is also itching her eyes a lot.    Past Medical History:   Diagnosis Date     Deep vein thrombosis (H)      Generalized anxiety disorder      Hyperlipidemia      Hypertension      Kidney stone      Nephrolithiasis      Obesity      TIA (transient ischemic attack)              Family History   Problem Relation Age of Onset     Cancer Mother      Heart Disease Mother      Heart Disease Father      Cancer Sister      Cancer Daughter         breast     No Known Problems Son      No Known Problems Son      No Known Problems Son      Social History     Socioeconomic History     Marital status:      Spouse name: Not on file     Number of children: Not on file     Years of education: Not on file     Highest education level: Not on file   Occupational History     Not on file   Tobacco Use     Smoking status:  Never Smoker     Smokeless tobacco: Never Used   Substance and Sexual Activity     Alcohol use: Yes     Comment: Alcoholic Drinks/day: rare     Drug use: No     Sexual activity: Not on file   Other Topics Concern     Not on file   Social History Narrative    Lives alone.  Retired .  Four children. Jeffrey, Igor, Elizabeth and Kelvin.     Social Determinants of Health     Financial Resource Strain: Not on file   Food Insecurity: Not on file   Transportation Needs: Not on file   Physical Activity: Not on file   Stress: Not on file   Social Connections: Not on file   Intimate Partner Violence: Not on file   Housing Stability: Not on file         REVIEW OF SYSTEM: Dry eyes is a problem and she has been irritated in the right eye.  But no signs of any foreign bodies.  She denies any fevers chills nausea vomit diarrhea change in vision hearing taste or smell weakness one-sided chest pain shortness of breath.  Denies incontinent of stool polyphagia polydipsia polyuria depression anxiety and the main review of systems is negative.      PHYSICAL EXAM: Patient is alert pleasant does not appear to be in acute shoes head is normocephalic and atraumatic right eye is reddened around the area but no signs of any periorbital edema.  She is somewhat injected on the lower lid at this time and this is likely dry eyes and not getting her eyedrops.  Heart sounds are regular lungs were clear to auscultation abdomen soft nontender.  Bowel sounds are positive.  Extremities show trace edema bilateral no change since last exam neurologic Jair was nonfocal and affect was pleasant.        LABS: On 1/14/2022 white count was 4.5, hemoglobin is 12.9, platelets 179,000.    On that date basic metabolic profile is as follows; sodium is 142, potassium 3.9, chloride was 104, CO2 was 9, BUN was 19, creatinine 0.6, calcium was 9.3, GFR was 85.    Vitals; blood pressure is 110/67    Pulse of 75    Temperature is 97.4    Respiration 16    O2 sats  97%.      ASSESSMENT:   Encounter Diagnoses   Name Primary?     Sepsis, due to unspecified organism, unspecified whether acute organ dysfunction present (H) Yes     Essential hypertension      Chronic deep vein thrombosis (DVT) of lower extremity, unspecified laterality, unspecified vein (H)         PLAN: I did discuss with nurse manager about the eyedrops and she is good to look into this for now going to schedule it every 6 hours and every 2 hours as needed.  Given that she should get it scheduled and she wouldn't have to ask for it at this time.    We'll continue with therapies at this time and continue to monitor above medical problems and she will need to follow-up with ophthalmology.    No other changes to care plan at this time.        Electronically signed by: CACHORRO SR DO          Sincerely,        CACHORRO SR DO

## 2022-01-25 NOTE — PROGRESS NOTES
[facility] GERIATRIC SERVICES    Facility:  [facility] ([facility]) [[id number]]  Code Status: FULL CODE      CHIEF COMPLAINT/REASON FOR VISIT:  Chief Complaint   Patient presents with     RECHECK       HISTORY:      HPI: Ruth is a 89 year old female who resides here in the TCU at [facility] secondary to hospitalization for sepsis.  She is finished her antibiotics and there is no signs of any recurrent infection.  She is progressing slowly with physical and Occupational Therapy but as anticipated and she still feels somewhat weak according to last note.  At that time she did deny any issues.    Her eyelid around her eye and around her eye is reddened at this time and she scratching it she does not believe she is getting her eyedrops at this time and she supposed to have him every 2 hours as needed for dry eyes.  And she also also on erythromycin ointment at bedtime.  And I'm going to schedule her other ointment 1 4 times a day and every 2 hours as needed.  She has not get this enough and it is affecting her comfort.  She is also itching her eyes a lot.    Past Medical History:   Diagnosis Date     Deep vein thrombosis (H)      Generalized anxiety disorder      Hyperlipidemia      Hypertension      Kidney stone      Nephrolithiasis      Obesity      TIA (transient ischemic attack)              Family History   Problem Relation Age of Onset     Cancer Mother      Heart Disease Mother      Heart Disease Father      Cancer Sister      Cancer Daughter         breast     No Known Problems Son      No Known Problems Son      No Known Problems Son      Social History     Socioeconomic History     Marital status:      Spouse name: Not on file     Number of children: Not on file     Years of education: Not on file     Highest education level: Not on file   Occupational History     Not on file   Tobacco Use     Smoking status: Never Smoker     Smokeless tobacco: Never Used   Substance and Sexual Activity      Alcohol use: Yes     Comment: Alcoholic Drinks/day: rare     Drug use: No     Sexual activity: Not on file   Other Topics Concern     Not on file   Social History Narrative    Lives alone.  Retired .  Four children. Jeffrey, Igor, Elizabeth and Kelvin.     Social Determinants of Health     Financial Resource Strain: Not on file   Food Insecurity: Not on file   Transportation Needs: Not on file   Physical Activity: Not on file   Stress: Not on file   Social Connections: Not on file   Intimate Partner Violence: Not on file   Housing Stability: Not on file         REVIEW OF SYSTEM: Dry eyes is a problem and she has been irritated in the right eye.  But no signs of any foreign bodies.  She denies any fevers chills nausea vomit diarrhea change in vision hearing taste or smell weakness one-sided chest pain shortness of breath.  Denies incontinent of stool polyphagia polydipsia polyuria depression anxiety and the main review of systems is negative.      PHYSICAL EXAM: Patient is alert pleasant does not appear to be in acute shoes head is normocephalic and atraumatic right eye is reddened around the area but no signs of any periorbital edema.  She is somewhat injected on the lower lid at this time and this is likely dry eyes and not getting her eyedrops.  Heart sounds are regular lungs were clear to auscultation abdomen soft nontender.  Bowel sounds are positive.  Extremities show trace edema bilateral no change since last exam neurologic Jair was nonfocal and affect was pleasant.        LABS: On 1/14/2022 white count was 4.5, hemoglobin is 12.9, platelets 179,000.    On that date basic metabolic profile is as follows; sodium is 142, potassium 3.9, chloride was 104, CO2 was 9, BUN was 19, creatinine 0.6, calcium was 9.3, GFR was 85.    Vitals; blood pressure is 110/67    Pulse of 75    Temperature is 97.4    Respiration 16    O2 sats 97%.      ASSESSMENT:   Encounter Diagnoses   Name Primary?     Sepsis, due to  unspecified organism, unspecified whether acute organ dysfunction present (H) Yes     Essential hypertension      Chronic deep vein thrombosis (DVT) of lower extremity, unspecified laterality, unspecified vein (H)         PLAN: I did discuss with nurse manager about the eyedrops and she is good to look into this for now going to schedule it every 6 hours and every 2 hours as needed.  Given that she should get it scheduled and she wouldn't have to ask for it at this time.    We'll continue with therapies at this time and continue to monitor above medical problems and she will need to follow-up with ophthalmology.    No other changes to care plan at this time.        Electronically signed by: CACHORRO SR DO

## 2022-01-27 ASSESSMENT — MIFFLIN-ST. JEOR: SCORE: 1273.64

## 2022-01-27 NOTE — PROGRESS NOTES
[facility] GERIATRIC SERVICES    Facility:  [facility] ([facility]) [[id number]]  Code Status: FULL CODE      CHIEF COMPLAINT/REASON FOR VISIT:  Chief Complaint   Patient presents with     RECHECK       HISTORY:      HPI: Ruth is a 89 year old [facility]  Her recent hospitalization was for sepsis and infection and she was treated appropriately with antibiotics.  She was then sent here to the TCU for physical and Occupational Therapy.  I did restart her eyedrops secondary to she had some scratching around her eyes and some redness and irritation but no signs of any periorbital edema or infection in the eye on last visit.  She is supposed to get her eyedrops every 2 hours as needed for dry eyes however on last visit I did it scheduled every 6 hours and every 2 hours as needed.  It is hard for her to ask for these things at this time and she is still progressing very slowly with physical and Occupational Therapy.    The eye irritation has done well at this time when she is getting her eyedrops.  The redness is down at this time and does not look nearly as bad.  There is no signs of any conjunctivitis or blepharitis.  She denies any other issues at this time.    Past Medical History:   Diagnosis Date     Deep vein thrombosis (H)      Generalized anxiety disorder      Hyperlipidemia      Hypertension      Kidney stone      Nephrolithiasis      Obesity      TIA (transient ischemic attack)              Family History   Problem Relation Age of Onset     Cancer Mother      Heart Disease Mother      Heart Disease Father      Cancer Sister      Cancer Daughter         breast     No Known Problems Son      No Known Problems Son      No Known Problems Son      Social History     Socioeconomic History     Marital status:      Spouse name: Not on file     Number of children: Not on file     Years of education: Not on file     Highest education level: Not on file   Occupational  History     Not on file   Tobacco Use     Smoking status: Never Smoker     Smokeless tobacco: Never Used   Substance and Sexual Activity     Alcohol use: Yes     Comment: Alcoholic Drinks/day: rare     Drug use: No     Sexual activity: Not on file   Other Topics Concern     Not on file   Social History Narrative    Lives alone.  Retired .  Four children. Jeffrey, Igor, Elizabeth and Kelvin.     Social Determinants of Health     Financial Resource Strain: Not on file   Food Insecurity: Not on file   Transportation Needs: Not on file   Physical Activity: Not on file   Stress: Not on file   Social Connections: Not on file   Intimate Partner Violence: Not on file   Housing Stability: Not on file         REVIEW OF SYSTEM: Patient says her pain is under adequate control denies any fever chills nausea vomit diarrhea change in vision hearing taste or smell weakness one-sided chest pain shortness of breath.  Denies any current shortness stool polyphagia polydipsia polyuria depression or anxiety remainder the review of systems is negative.      PHYSICAL EXAM: Patient is alert pleasant is in a very good mood today head was normocephalic and atraumatic sclera conjunctiva was clear today with some redness around the lower conjunctiva lid but no signs of any discharge.  The redness around her orbital area and her face has cleared up at this time heart sounds were regular lungs were clear abdomen was soft nontender extremities showed no cyanosis or edema neurologic Jair was nonfocal and affect was pleasant.  Skin was intact.        LABS: On 1/14/2022 CBC was all within normal limits as well as the basic metabolic profile.    Vitals; blood pressure 122/70    Pulse 68    Temperature is 96.9    Respirations 16    O2 sats 95%.      ASSESSMENT:   Encounter Diagnoses   Name Primary?     Sepsis, due to unspecified organism, unspecified whether acute organ dysfunction present (H) Yes     Essential hypertension      Chronic deep  vein thrombosis (DVT) of lower extremity, unspecified laterality, unspecified vein (H)      Altered mental status, unspecified altered mental status type         PLAN: Plan at this time patient is okay to discharge to assisted living when therapies and  are ready.  From a medical standpoint she is cleared.  So she will likely go to assisted living and I will continue to monitor above medical problems.  She will continue cares at this time and orders have been written.        Electronically signed by: CACHORRO SR DO

## 2022-01-28 ENCOUNTER — TRANSITIONAL CARE UNIT VISIT (OUTPATIENT)
Dept: GERIATRICS | Facility: CLINIC | Age: 87
End: 2022-01-28
Payer: COMMERCIAL

## 2022-01-28 VITALS
RESPIRATION RATE: 16 BRPM | OXYGEN SATURATION: 95 % | HEIGHT: 65 IN | BODY MASS INDEX: 30.01 KG/M2 | DIASTOLIC BLOOD PRESSURE: 70 MMHG | SYSTOLIC BLOOD PRESSURE: 122 MMHG | HEART RATE: 68 BPM | TEMPERATURE: 96.9 F | WEIGHT: 180.1 LBS

## 2022-01-28 DIAGNOSIS — R41.82 ALTERED MENTAL STATUS, UNSPECIFIED ALTERED MENTAL STATUS TYPE: ICD-10-CM

## 2022-01-28 DIAGNOSIS — A41.9 SEPSIS, DUE TO UNSPECIFIED ORGANISM, UNSPECIFIED WHETHER ACUTE ORGAN DYSFUNCTION PRESENT (H): Primary | ICD-10-CM

## 2022-01-28 DIAGNOSIS — I10 ESSENTIAL HYPERTENSION: ICD-10-CM

## 2022-01-28 DIAGNOSIS — I82.509 CHRONIC DEEP VEIN THROMBOSIS (DVT) OF LOWER EXTREMITY, UNSPECIFIED LATERALITY, UNSPECIFIED VEIN (H): ICD-10-CM

## 2022-01-28 PROCEDURE — 99309 SBSQ NF CARE MODERATE MDM 30: CPT | Performed by: FAMILY MEDICINE

## 2022-01-28 ASSESSMENT — MIFFLIN-ST. JEOR: SCORE: 1237.81

## 2022-01-28 NOTE — LETTER
1/27/2022        RE: Ruth Mack  [address] [place] [address]        M [facility] GERIATRIC SERVICES    Facility:  [facility] ([facility]) [[id number]]  Code Status: FULL CODE      CHIEF COMPLAINT/REASON FOR VISIT:  Chief Complaint   Patient presents with     RECHECK       HISTORY:      HPI: Ruth is a 89 year old female [facility]  Her recent hospitalization was for sepsis and infection and she was treated appropriately with antibiotics.  She was then sent here to the TCU for physical and Occupational Therapy.  I did restart her eyedrops secondary to she had some scratching around her eyes and some redness and irritation but no signs of any periorbital edema or infection in the eye on last visit.  She is supposed to get her eyedrops every 2 hours as needed for dry eyes however on last visit I did it scheduled every 6 hours and every 2 hours as needed.  It is hard for her to ask for these things at this time and she is still progressing very slowly with physical and Occupational Therapy.    The eye irritation has done well at this time when she is getting her eyedrops.  The redness is down at this time and does not look nearly as bad.  There is no signs of any conjunctivitis or blepharitis.  She denies any other issues at this time.    Past Medical History:   Diagnosis Date     Deep vein thrombosis (H)      Generalized anxiety disorder      Hyperlipidemia      Hypertension      Kidney stone      Nephrolithiasis      Obesity      TIA (transient ischemic attack)              Family History   Problem Relation Age of Onset     Cancer Mother      Heart Disease Mother      Heart Disease Father      Cancer Sister      Cancer Daughter         breast     No Known Problems Son      No Known Problems Son      No Known Problems Son      Social History     Socioeconomic History     Marital status:      Spouse name: Not on file     Number of children: Not on file      Years of education: Not on file     Highest education level: Not on file   Occupational History     Not on file   Tobacco Use     Smoking status: Never Smoker     Smokeless tobacco: Never Used   Substance and Sexual Activity     Alcohol use: Yes     Comment: Alcoholic Drinks/day: rare     Drug use: No     Sexual activity: Not on file   Other Topics Concern     Not on file   Social History Narrative    Lives alone.  Retired .  Four children. Jeffrey, Igor, Elizabeth and Kelvin.     Social Determinants of Health     Financial Resource Strain: Not on file   Food Insecurity: Not on file   Transportation Needs: Not on file   Physical Activity: Not on file   Stress: Not on file   Social Connections: Not on file   Intimate Partner Violence: Not on file   Housing Stability: Not on file         REVIEW OF SYSTEM: Patient says her pain is under adequate control denies any fever chills nausea vomit diarrhea change in vision hearing taste or smell weakness one-sided chest pain shortness of breath.  Denies any current shortness stool polyphagia polydipsia polyuria depression or anxiety remainder the review of systems is negative.      PHYSICAL EXAM: Patient is alert pleasant is in a very good mood today head was normocephalic and atraumatic sclera conjunctiva was clear today with some redness around the lower conjunctiva lid but no signs of any discharge.  The redness around her orbital area and her face has cleared up at this time heart sounds were regular lungs were clear abdomen was soft nontender extremities showed no cyanosis or edema neurologic Jair was nonfocal and affect was pleasant.  Skin was intact.        LABS: On 1/14/2022 CBC was all within normal limits as well as the basic metabolic profile.    Vitals; blood pressure 122/70    Pulse 68    Temperature is 96.9    Respirations 16    O2 sats 95%.      ASSESSMENT:   Encounter Diagnoses   Name Primary?     Sepsis, due to unspecified organism, unspecified whether  acute organ dysfunction present (H) Yes     Essential hypertension      Chronic deep vein thrombosis (DVT) of lower extremity, unspecified laterality, unspecified vein (H)      Altered mental status, unspecified altered mental status type         PLAN: Plan at this time patient is okay to discharge to assisted living when therapies and  are ready.  From a medical standpoint she is cleared.  So she will likely go to assisted living and I will continue to monitor above medical problems.  She will continue cares at this time and orders have been written.        Electronically signed by: CACHORRO SR DO          Sincerely,        CACHORRO SR DO

## 2022-02-01 ENCOUNTER — TRANSITIONAL CARE UNIT VISIT (OUTPATIENT)
Dept: GERIATRICS | Facility: CLINIC | Age: 87
End: 2022-02-01
Payer: COMMERCIAL

## 2022-02-01 VITALS
WEIGHT: 180.1 LBS | DIASTOLIC BLOOD PRESSURE: 76 MMHG | HEART RATE: 80 BPM | HEIGHT: 65 IN | TEMPERATURE: 97.6 F | BODY MASS INDEX: 30.01 KG/M2 | RESPIRATION RATE: 16 BRPM | OXYGEN SATURATION: 96 % | SYSTOLIC BLOOD PRESSURE: 100 MMHG

## 2022-02-01 DIAGNOSIS — R41.82 ALTERED MENTAL STATUS, UNSPECIFIED ALTERED MENTAL STATUS TYPE: ICD-10-CM

## 2022-02-01 DIAGNOSIS — I10 ESSENTIAL HYPERTENSION: ICD-10-CM

## 2022-02-01 DIAGNOSIS — I82.509 CHRONIC DEEP VEIN THROMBOSIS (DVT) OF LOWER EXTREMITY, UNSPECIFIED LATERALITY, UNSPECIFIED VEIN (H): ICD-10-CM

## 2022-02-01 DIAGNOSIS — A41.9 SEPSIS, DUE TO UNSPECIFIED ORGANISM, UNSPECIFIED WHETHER ACUTE ORGAN DYSFUNCTION PRESENT (H): Primary | ICD-10-CM

## 2022-02-01 PROCEDURE — 99309 SBSQ NF CARE MODERATE MDM 30: CPT | Performed by: FAMILY MEDICINE

## 2022-02-01 ASSESSMENT — MIFFLIN-ST. JEOR: SCORE: 1237.81

## 2022-02-01 NOTE — LETTER
2/1/2022        RE: Ruth Mack  [address] [place] [address]        M [facility] GERIATRIC SERVICES    Facility:  [facility] ([facility]) [[id number]]  Code Status: FULL CODE      CHIEF COMPLAINT/REASON FOR VISIT:  Chief Complaint   Patient presents with     RECHECK       HISTORY:      HPI: Ruth is a 89 year old [facility] she did a recent hospitalization for sepsis and antibiotics are done.  There is been no signs since her stay here at the TCU if any recurrent infections or urinary tract infections or pneumonias.  She did continue eyedrops for some redness around the eye that she was gradual but that is improved at this time.  And she is progressed as anticipated with physical and Occupational Therapy.  She had no real acute events during this TCU stay.    Patient feels she is very close to her baseline function denies any issues at this time.  She is scheduled to discharge in 2 days.  She has progressed with therapies well and there has been no issues and therapies have no issues.    Past Medical History:   Diagnosis Date     Deep vein thrombosis (H)      Generalized anxiety disorder      Hyperlipidemia      Hypertension      Kidney stone      Nephrolithiasis      Obesity      TIA (transient ischemic attack)              Family History   Problem Relation Age of Onset     Cancer Mother      Heart Disease Mother      Heart Disease Father      Cancer Sister      Cancer Daughter         breast     No Known Problems Son      No Known Problems Son      No Known Problems Son      Social History     Socioeconomic History     Marital status:      Spouse name: Not on file     Number of children: Not on file     Years of education: Not on file     Highest education level: Not on file   Occupational History     Not on file   Tobacco Use     Smoking status: Never Smoker     Smokeless tobacco: Never Used   Substance and Sexual Activity     Alcohol use: Yes      Comment: Alcoholic Drinks/day: rare     Drug use: No     Sexual activity: Not on file   Other Topics Concern     Not on file   Social History Narrative    Lives alone.  Retired .  Four children. Jeffrey, Igor, Elizabeth and Kelvin.     Social Determinants of Health     Financial Resource Strain: Not on file   Food Insecurity: Not on file   Transportation Needs: Not on file   Physical Activity: Not on file   Stress: Not on file   Social Connections: Not on file   Intimate Partner Violence: Not on file   Housing Stability: Not on file         REVIEW OF SYSTEM: Patient claims outside her aches and pains her pain is well controlled.  She denies any fevers chills nausea vomit diarrhea change in vision hearing taste or smell weakness one-sided chest pain shortness of breath.  Denies any congeners stool polyphagia polydipsia polyuria depression or anxiety and the main review of systems is negative.      PHYSICAL EXAM: Patient is alert pleasant does not appear to be in acute distress head is normocephalic and atraumatic sclera conjunctiva is clear.  She does a little slight redness around the lower part of her eye but this is improved.  Heart sounds are regular lungs are clear abdomen soft nontender bowel sounds are positive.  There is no rebound or guarding.  Extremities show trace edema bilateral.  And neurologic seems nonfocal affect was pleasant.        LABS: On 1/14/2020 CBC was all within normal limits a basic metabolic profile was all within normal limits.    Vitals; blood pressure 106/64    Pulse 68    Temperature is 96.9    Respirations 16    O2 sats 95%.      ASSESSMENT:   Encounter Diagnoses   Name Primary?     Sepsis, due to unspecified organism, unspecified whether acute organ dysfunction present (H) Yes     Essential hypertension      Chronic deep vein thrombosis (DVT) of lower extremity, unspecified laterality, unspecified vein (H)      Altered mental status, unspecified altered mental status type          PLAN: Plan at this time we will continue to monitor above medical problems and no other changes to care plan at this time.  Care plan was reviewed and is appropriate.    We will start discharge planning for Thursday.        Electronically signed by: CACHORRO SR DO          Sincerely,        CACHORRO SR DO

## 2022-02-01 NOTE — PROGRESS NOTES
[facility] GERIATRIC SERVICES    Facility:  [facility] ([facility]) [[id number]]  Code Status: FULL CODE      CHIEF COMPLAINT/REASON FOR VISIT:  Chief Complaint   Patient presents with     RECHECK       HISTORY:      HPI: Ruth is a 89 year [facility] did a recent hospitalization for sepsis and antibiotics are done.  There is been no signs since her stay here at the TCU if any recurrent infections or urinary tract infections or pneumonias.  She did continue eyedrops for some redness around the eye that she was gradual but that is improved at this time.  And she is progressed as anticipated with physical and Occupational Therapy.  She had no real acute events during this TCU stay.    Patient feels she is very close to her baseline function denies any issues at this time.  She is scheduled to discharge in 2 days.  She has progressed with therapies well and there has been no issues and therapies have no issues.    Past Medical History:   Diagnosis Date     Deep vein thrombosis (H)      Generalized anxiety disorder      Hyperlipidemia      Hypertension      Kidney stone      Nephrolithiasis      Obesity      TIA (transient ischemic attack)              Family History   Problem Relation Age of Onset     Cancer Mother      Heart Disease Mother      Heart Disease Father      Cancer Sister      Cancer Daughter         breast     No Known Problems Son      No Known Problems Son      No Known Problems Son      Social History     Socioeconomic History     Marital status:      Spouse name: Not on file     Number of children: Not on file     Years of education: Not on file     Highest education level: Not on file   Occupational History     Not on file   Tobacco Use     Smoking status: Never Smoker     Smokeless tobacco: Never Used   Substance and Sexual Activity     Alcohol use: Yes     Comment: Alcoholic Drinks/day: rare     Drug use: No     Sexual activity: Not on file    Other Topics Concern     Not on file   Social History Narrative    Lives alone.  Retired .  Four children. Jeffrey, Igor, Elizabeth and Kelvin.     Social Determinants of Health     Financial Resource Strain: Not on file   Food Insecurity: Not on file   Transportation Needs: Not on file   Physical Activity: Not on file   Stress: Not on file   Social Connections: Not on file   Intimate Partner Violence: Not on file   Housing Stability: Not on file         REVIEW OF SYSTEM: Patient claims outside her aches and pains her pain is well controlled.  She denies any fevers chills nausea vomit diarrhea change in vision hearing taste or smell weakness one-sided chest pain shortness of breath.  Denies any congeners stool polyphagia polydipsia polyuria depression or anxiety and the main review of systems is negative.      PHYSICAL EXAM: Patient is alert pleasant does not appear to be in acute distress head is normocephalic and atraumatic sclera conjunctiva is clear.  She does a little slight redness around the lower part of her eye but this is improved.  Heart sounds are regular lungs are clear abdomen soft nontender bowel sounds are positive.  There is no rebound or guarding.  Extremities show trace edema bilateral.  And neurologic seems nonfocal affect was pleasant.        LABS: On 1/14/2020 CBC was all within normal limits a basic metabolic profile was all within normal limits.    Vitals; blood pressure 106/64    Pulse 68    Temperature is 96.9    Respirations 16    O2 sats 95%.      ASSESSMENT:   Encounter Diagnoses   Name Primary?     Sepsis, due to unspecified organism, unspecified whether acute organ dysfunction present (H) Yes     Essential hypertension      Chronic deep vein thrombosis (DVT) of lower extremity, unspecified laterality, unspecified vein (H)      Altered mental status, unspecified altered mental status type         PLAN: Plan at this time we will continue to monitor above medical problems and no  other changes to care plan at this time.  Care plan was reviewed and is appropriate.    We will start discharge planning for Thursday.        Electronically signed by: CACHORRO SR DO

## 2022-02-03 ENCOUNTER — TRANSITIONAL CARE UNIT VISIT (OUTPATIENT)
Dept: GERIATRICS | Facility: CLINIC | Age: 87
End: 2022-02-03
Payer: COMMERCIAL

## 2022-02-03 VITALS
DIASTOLIC BLOOD PRESSURE: 68 MMHG | BODY MASS INDEX: 29.97 KG/M2 | HEART RATE: 60 BPM | HEIGHT: 65 IN | OXYGEN SATURATION: 95 % | RESPIRATION RATE: 17 BRPM | SYSTOLIC BLOOD PRESSURE: 135 MMHG | TEMPERATURE: 97.4 F

## 2022-02-03 DIAGNOSIS — Z53.9 ERRONEOUS ENCOUNTER--DISREGARD: Primary | ICD-10-CM

## 2022-02-03 NOTE — LETTER
2/3/2022        RE: Ruth Mack  [address] [place] [address]        A user error has taken place: encounter opened in error, closed for administrative reasons.          Sincerely,        CACHORRO SR, DO

## 2022-02-09 ENCOUNTER — TELEPHONE (OUTPATIENT)
Dept: INTERNAL MEDICINE | Facility: CLINIC | Age: 87
End: 2022-02-09
Payer: COMMERCIAL

## 2022-02-09 NOTE — TELEPHONE ENCOUNTER
Reason for Call:  Home Health Care    nitin with [facility] called regarding (reason for call): verbal order    Orders are needed for this patient.     Delay start of care for 2/11/22-.RN PT OT ST and home health aide    PT will be doing the start of care.      Pt Provider: Dr Puente    Phone Number Homecare Nurse can be reached at: [phone]    Can we leave a detailed message on this number? YES        Call taken on 2/9/2022 at 3:44 PM by Pam J. Behr

## 2022-02-11 ENCOUNTER — MEDICAL CORRESPONDENCE (OUTPATIENT)
Dept: HEALTH INFORMATION MANAGEMENT | Facility: CLINIC | Age: 87
End: 2022-02-11
Payer: COMMERCIAL

## 2022-02-11 NOTE — PROGRESS NOTES
[facility] Rehabilitation Service    Outpatient Physical Therapy Discharge Note  Patient: Ruth Mack  : 1931    Beginning/End Dates of Reporting Period:  21     Referring Provider: Dr. Puente     Therapy Diagnosis: poor balance      Client Self Report: See eval    Objective Measurements:  See eval     Outcome Measures (most recent score):      Goals:  Goal Identifier HEP   Goal Description Patient will be independent in HEP to address impairments and limitations in functional mobility/endurance.   Target Date 22   Date Met      Progress (detail required for progress note):       Goal Identifier Standing   Goal Description Patient will be able to stand for >20 seocnds w/o UE support to demonstrate improvement in balance and ability to safely perform ADLs.   Target Date 22   Date Met      Progress (detail required for progress note):       Goal Identifier LEFS   Goal Description Patient will achieve a score of 21 or greater (12/80 on eval) on The Lower Extremity Functional Scale to demonstrate a significant improvement in symptoms   Target Date 22   Date Met      Progress (detail required for progress note):       Goal Identifier     Goal Description     Target Date     Date Met      Progress (detail required for progress note):       Goal Identifier     Goal Description     Target Date     Date Met      Progress (detail required for progress note):       Goal Identifier     Goal Description     Target Date     Date Met      Progress (detail required for progress note):       Goal Identifier     Goal Description     Target Date     Date Met      Progress (detail required for progress note):       Goal Identifier     Goal Description     Target Date     Date Met      Progress (detail required for progress note):         Plan:  Discharge from therapy.    Discharge:    Reason for Discharge: Patient has  failed to schedule further appointments.  She no showed one appointment and cancelled one appointment    Discharge Plan: Patient to continue home program.

## 2022-02-11 NOTE — ADDENDUM NOTE
Encounter addended by: Velvet Meredith, PT on: 2/11/2022 11:29 AM   Actions taken: Episode resolved, Clinical Note Signed, Flowsheet accepted

## 2022-02-14 ENCOUNTER — TELEPHONE (OUTPATIENT)
Dept: INTERNAL MEDICINE | Facility: CLINIC | Age: 87
End: 2022-02-14
Payer: COMMERCIAL

## 2022-02-14 NOTE — TELEPHONE ENCOUNTER
Reason for Call: Request for an order or referral:    Order or referral being requested:   Physical therapy  2 times weekly for 4 weeks    Home Health Aide  1 time weekly for 4 weeks      Date needed: as soon as possible    Has the patient been seen by the PCP for this problem? NO    Additional comments: n/a    Phone number Patient can be reached at:  Other phone number:  [phone]    Best Time:  anytime    Can we leave a detailed message on this number?  YES    Call taken on 2/14/2022 at 8:09 AM by Claire Sawant

## 2022-02-22 ENCOUNTER — TELEPHONE (OUTPATIENT)
Dept: INTERNAL MEDICINE | Facility: CLINIC | Age: 87
End: 2022-02-22
Payer: COMMERCIAL

## 2022-02-22 ENCOUNTER — MEDICAL CORRESPONDENCE (OUTPATIENT)
Dept: HEALTH INFORMATION MANAGEMENT | Facility: CLINIC | Age: 87
End: 2022-02-22
Payer: COMMERCIAL

## 2022-03-09 ENCOUNTER — TELEPHONE (OUTPATIENT)
Dept: INTERNAL MEDICINE | Facility: CLINIC | Age: 87
End: 2022-03-09
Payer: COMMERCIAL

## 2022-03-09 NOTE — TELEPHONE ENCOUNTER
Shirley was given verbal ok from patient's provider for the home care orders as requested.  Stefani Nolasco LPN

## 2022-03-09 NOTE — TELEPHONE ENCOUNTER
Reason for Call: Request for an order or referral:    Order or referral being requested:   Physical therapy  2 times weekly for 3 weeks  1 time weekly for 1 week    Strength, balance and endurance    Date needed: as soon as possible    Has the patient been seen by the PCP for this problem? YES    Additional comments: n/a    Phone number Patient can be reached at:  Other phone number:  [phone]    Best Time:  anytime    Can we leave a detailed message on this number?  YES    Call taken on 3/9/2022 at 3:25 PM by Claire Sawant

## 2022-03-10 ENCOUNTER — VIRTUAL VISIT (OUTPATIENT)
Dept: INTERNAL MEDICINE | Facility: CLINIC | Age: 87
End: 2022-03-10
Payer: COMMERCIAL

## 2022-03-10 DIAGNOSIS — R53.81 DEBILITY: ICD-10-CM

## 2022-03-10 DIAGNOSIS — G93.41 SEPSIS DUE TO ESCHERICHIA COLI WITH ENCEPHALOPATHY WITHOUT SEPTIC SHOCK (H): Primary | ICD-10-CM

## 2022-03-10 DIAGNOSIS — I10 PRIMARY HYPERTENSION: ICD-10-CM

## 2022-03-10 DIAGNOSIS — A41.51 SEPSIS DUE TO ESCHERICHIA COLI WITH ENCEPHALOPATHY WITHOUT SEPTIC SHOCK (H): Primary | ICD-10-CM

## 2022-03-10 DIAGNOSIS — F41.1 GENERALIZED ANXIETY DISORDER: ICD-10-CM

## 2022-03-10 DIAGNOSIS — R65.20 SEPSIS DUE TO ESCHERICHIA COLI WITH ENCEPHALOPATHY WITHOUT SEPTIC SHOCK (H): Primary | ICD-10-CM

## 2022-03-10 DIAGNOSIS — E78.2 MIXED HYPERLIPIDEMIA: ICD-10-CM

## 2022-03-10 PROCEDURE — 99214 OFFICE O/P EST MOD 30 MIN: CPT | Mod: 95 | Performed by: INTERNAL MEDICINE

## 2022-03-10 NOTE — PROGRESS NOTES
"Hayde is a 89 year old who is being evaluated via a billable video visit.      How would you like to obtain your AVS? MyChart  If the video visit is dropped, the invitation should be resent by:   Will anyone else be joining your video visit?       Video Start Time: 11:34 AM    Assessment & Plan     1. Sepsis due to Escherichia coli with encephalopathy without septic shock (H)  Resolved    2. Debility  Continue with occupational and physical therapy.  She would benefit from ongoing occupational and physical therapy at home.  She is homebound due to debility and she has not yet back to her baseline.  This was assessed face-to-face    3. Primary hypertension  Controlled continue same    4. Mixed hyperlipidemia  Continue statin    5. Generalized anxiety disorder  Stable     BMI:   Estimated body mass index is 29.97 kg/m  as calculated from the following:    Height as of 2/3/22: 1.651 m (5' 5\").    Weight as of 2/1/22: 81.7 kg (180 lb 1.6 oz).         Return in about 4 weeks (around 4/7/2022) for video visit, if symptoms worsen/fail to improve.    Steve Puente MD  [facility]    Subjective   Hayde is a 89 year old who presents for the following health issues this 89-year-old woman who is now in independent living at a nursing facility is seen with her daughter for follow-up after hospitalization and transitional care stay.  She was admitted with sepsis and found to have E. coli infection treated with antibiotics.  She has recovered slowly with the assistance of physical and occupational therapy.  She is not requiring any pain medication.  Overall doing okay.    HPI           Review of Systems         Objective           Vitals:  No vitals were obtained today due to virtual visit.    Physical Exam                   Video-Visit Details    Type of service:  Video Visit    Video End Time:11:50 AM    Originating Location (pt. Location): Home    Distant Location (provider location):  [facility]     Platform used for Video Visit: Other: FaceTime         Medications, Allergies and Problem List   Reviewed, reconciled and updated  Post Discharge Medication Reconciliation Status: discharge medications reconciled, continue medications without change

## 2022-03-18 ENCOUNTER — TELEPHONE (OUTPATIENT)
Dept: INTERNAL MEDICINE | Facility: CLINIC | Age: 87
End: 2022-03-18
Payer: COMMERCIAL

## 2022-03-18 NOTE — TELEPHONE ENCOUNTER
Reason for Call: Request for an order or referral:    OK TO WAIT FOR PCP PER HOME CARE    Order or referral being requested:   Order for OK to move 1 OT visit to be moved to   03/22/2022    Date needed: as soon as possible    Has the patient been seen by the PCP for this problem? YES    Additional comments: Per patient request    Phone number Patient can be reached at:  Other phone number:  [phone]    Best Time:  anytime    Can we leave a detailed message on this number?  YES    Call taken on 3/18/2022 at 11:14 AM by Claire Sawant

## 2022-03-22 ENCOUNTER — TELEPHONE (OUTPATIENT)
Dept: INTERNAL MEDICINE | Facility: CLINIC | Age: 87
End: 2022-03-22

## 2022-03-22 DIAGNOSIS — Z53.9 DIAGNOSIS NOT YET DEFINED: Primary | ICD-10-CM

## 2022-03-22 PROCEDURE — G0180 MD CERTIFICATION HHA PATIENT: HCPCS | Performed by: INTERNAL MEDICINE

## 2022-03-25 ENCOUNTER — TELEPHONE (OUTPATIENT)
Dept: INTERNAL MEDICINE | Facility: CLINIC | Age: 87
End: 2022-03-25
Payer: COMMERCIAL

## 2022-03-25 NOTE — TELEPHONE ENCOUNTER
Reason for Call:  Home Health Care    Shirley with [facility] called regarding (reason for call): verbal order    Orders are needed for this patient.     Occ therapy:  2 times for 1 week to address safety with ADL and home excerise program    Pt Provider: Dr Puente    Phone Number Homecare Nurse can be reached at: [phone]    Can we leave a detailed message on this number? YES    Call taken on 3/25/2022 at 9:20 AM by Pam J. Behr

## 2022-03-30 ENCOUNTER — MEDICAL CORRESPONDENCE (OUTPATIENT)
Dept: HEALTH INFORMATION MANAGEMENT | Facility: CLINIC | Age: 87
End: 2022-03-30
Payer: COMMERCIAL

## 2022-07-06 DIAGNOSIS — I10 PRIMARY HYPERTENSION: Primary | ICD-10-CM

## 2022-07-06 RX ORDER — LISINOPRIL 10 MG/1
10 TABLET ORAL DAILY
COMMUNITY
Start: 2022-03-28 | End: 2022-07-06

## 2022-07-07 RX ORDER — METOPROLOL TARTRATE 25 MG/1
25 TABLET, FILM COATED ORAL 2 TIMES DAILY
Qty: 180 TABLET | Refills: 2 | Status: SHIPPED | OUTPATIENT
Start: 2022-07-07 | End: 2023-06-22

## 2022-07-07 RX ORDER — LISINOPRIL 10 MG/1
10 TABLET ORAL DAILY
Qty: 90 TABLET | Refills: 3 | Status: SHIPPED | OUTPATIENT
Start: 2022-07-07 | End: 2022-11-25

## 2022-07-08 NOTE — TELEPHONE ENCOUNTER
"Last Written Prescription Date:  11/5/21  Last Fill Quantity: 180,  # refills: 3   Last office visit provider:  3/10/22     Requested Prescriptions   Pending Prescriptions Disp Refills     metoprolol tartrate (LOPRESSOR) 25 MG tablet 180 tablet 3     Sig: Take 1 tablet (25 mg) by mouth 2 times daily       Beta-Blockers Protocol Passed - 7/6/2022  2:31 PM        Passed - Blood pressure under 140/90 in past 12 months     BP Readings from Last 3 Encounters:   02/03/22 135/68   02/01/22 100/76   01/28/22 122/70                 Passed - Patient is age 6 or older        Passed - Recent (12 mo) or future (30 days) visit within the authorizing provider's specialty     Patient has had an office visit with the authorizing provider or a provider within the authorizing providers department within the previous 12 mos or has a future within next 30 days. See \"Patient Info\" tab in inbasket, or \"Choose Columns\" in Meds & Orders section of the refill encounter.              Passed - Medication is active on med Thais Rogers RN 07/07/22 7:20 PM  "

## 2022-07-21 ENCOUNTER — MYC MEDICAL ADVICE (OUTPATIENT)
Dept: INTERNAL MEDICINE | Facility: CLINIC | Age: 87
End: 2022-07-21

## 2022-07-21 DIAGNOSIS — F41.1 GENERALIZED ANXIETY DISORDER: ICD-10-CM

## 2022-07-22 RX ORDER — BUSPIRONE HYDROCHLORIDE 15 MG/1
15 TABLET ORAL DAILY PRN
Qty: 30 TABLET | Refills: 9 | Status: SHIPPED | OUTPATIENT
Start: 2022-07-22 | End: 2023-10-17

## 2022-08-13 DIAGNOSIS — F41.1 GENERALIZED ANXIETY DISORDER: ICD-10-CM

## 2022-08-14 RX ORDER — BUSPIRONE HYDROCHLORIDE 15 MG/1
TABLET ORAL
Qty: 30 TABLET | Refills: 9 | OUTPATIENT
Start: 2022-08-14

## 2022-08-14 NOTE — TELEPHONE ENCOUNTER
Patient should have refills remaining on file at pharmacy.  Buspirone 15 mg filled 7/22/2022 #30 tablets with 9 refills to [id number].    busPIRone (BUSPAR) 15 MG tablet 30 tablet 9 7/22/2022  No   Sig - Route: Take 1 tablet (15 mg) by mouth daily as needed - Oral   Sent to pharmacy as: busPIRone HCl 15 MG Oral Tablet (BUSPAR)   Class: E-Prescribe   Order: [id number]   E-Prescribing Status: Receipt confirmed by pharmacy (7/22/2022  9:39 AM CDT       Christianne Barlow, RN  Triage Nurse Advisor

## 2022-09-16 DIAGNOSIS — G45.9 TIA (TRANSIENT ISCHEMIC ATTACK): ICD-10-CM

## 2022-09-16 NOTE — TELEPHONE ENCOUNTER
"Early refill request  Last Written Prescription Date:  11/5/21  Last Fill Quantity: 90,  # refills: 3   Last office visit provider:  3/10/22     Requested Prescriptions   Pending Prescriptions Disp Refills     atorvastatin (LIPITOR) 20 MG tablet [Pharmacy Med Name: ATORVASTATIN 20 MG TABLET] 90 tablet 3     Sig: TAKE 1 TABLET BY MOUTH EVERY DAY       Statins Protocol Passed - 9/16/2022 12:39 AM        Passed - LDL on file in past 12 months     Recent Labs   Lab Test 11/05/21  1254   LDL 97             Passed - No abnormal creatine kinase in past 12 months     No lab results found.             Passed - Recent (12 mo) or future (30 days) visit within the authorizing provider's specialty     Patient has had an office visit with the authorizing provider or a provider within the authorizing providers department within the previous 12 mos or has a future within next 30 days. See \"Patient Info\" tab in inbasket, or \"Choose Columns\" in Meds & Orders section of the refill encounter.              Passed - Medication is active on med list        Passed - Patient is age 18 or older        Passed - No active pregnancy on record        Passed - No positive pregnancy test in past 12 months             Isidra Schwartz RN 09/16/22 2:01 PM  "

## 2022-09-19 RX ORDER — ATORVASTATIN CALCIUM 20 MG/1
TABLET, FILM COATED ORAL
Qty: 90 TABLET | Refills: 3 | Status: SHIPPED | OUTPATIENT
Start: 2022-09-19 | End: 2023-06-22

## 2022-09-28 DIAGNOSIS — E55.9 VITAMIN D DEFICIENCY: ICD-10-CM

## 2022-09-28 RX ORDER — CHOLECALCIFEROL (VITAMIN D3) 25 MCG
TABLET ORAL
Qty: 100 TABLET | Refills: 1 | Status: SHIPPED | OUTPATIENT
Start: 2022-09-28 | End: 2023-06-22

## 2022-09-28 NOTE — TELEPHONE ENCOUNTER
"Last Written Prescription Date:  11/8/21  Last Fill Quantity: 90,  # refills: 3   Last office visit provider:  3/10/22     Requested Prescriptions   Pending Prescriptions Disp Refills     VITAMIN D3 25 MCG (1000 UT) tablet [Pharmacy Med Name: VITAMIN D3 25 MCG TABLET] 100 tablet 3     Sig: TAKE 1 TABLET BY MOUTH EVERY DAY       Vitamin Supplements (Adult) Protocol Passed - 9/28/2022  3:50 AM        Passed - High dose Vitamin D not ordered        Passed - Recent (12 mo) or future (30 days) visit within the authorizing provider's specialty     Patient has had an office visit with the authorizing provider or a provider within the authorizing providers department within the previous 12 mos or has a future within next 30 days. See \"Patient Info\" tab in inbasket, or \"Choose Columns\" in Meds & Orders section of the refill encounter.              Passed - Medication is active on med list             Thais Mitchell RN 09/28/22 12:45 PM  "

## 2022-10-01 ENCOUNTER — HEALTH MAINTENANCE LETTER (OUTPATIENT)
Age: 87
End: 2022-10-01

## 2022-11-25 ENCOUNTER — OFFICE VISIT (OUTPATIENT)
Dept: INTERNAL MEDICINE | Facility: CLINIC | Age: 87
End: 2022-11-25
Payer: COMMERCIAL

## 2022-11-25 VITALS
DIASTOLIC BLOOD PRESSURE: 59 MMHG | RESPIRATION RATE: 12 BRPM | BODY MASS INDEX: 30.79 KG/M2 | HEART RATE: 71 BPM | OXYGEN SATURATION: 99 % | TEMPERATURE: 98.1 F | WEIGHT: 185 LBS | SYSTOLIC BLOOD PRESSURE: 133 MMHG

## 2022-11-25 DIAGNOSIS — E55.9 VITAMIN D DEFICIENCY: ICD-10-CM

## 2022-11-25 DIAGNOSIS — G45.9 TIA (TRANSIENT ISCHEMIC ATTACK): ICD-10-CM

## 2022-11-25 DIAGNOSIS — I10 PRIMARY HYPERTENSION: Primary | ICD-10-CM

## 2022-11-25 DIAGNOSIS — I10 PRIMARY HYPERTENSION: ICD-10-CM

## 2022-11-25 DIAGNOSIS — E78.2 MIXED HYPERLIPIDEMIA: ICD-10-CM

## 2022-11-25 DIAGNOSIS — F41.1 GENERALIZED ANXIETY DISORDER: ICD-10-CM

## 2022-11-25 DIAGNOSIS — Z71.89 ADVANCE CARE PLANNING: ICD-10-CM

## 2022-11-25 DIAGNOSIS — Z00.00 ANNUAL PHYSICAL EXAM: Primary | ICD-10-CM

## 2022-11-25 DIAGNOSIS — I82.5Y2 CHRONIC DEEP VEIN THROMBOSIS (DVT) OF PROXIMAL VEIN OF LEFT LOWER EXTREMITY (H): ICD-10-CM

## 2022-11-25 LAB
ALBUMIN SERPL BCG-MCNC: 4.3 G/DL (ref 3.5–5.2)
ALP SERPL-CCNC: 96 U/L (ref 35–104)
ALT SERPL W P-5'-P-CCNC: 29 U/L (ref 10–35)
ANION GAP SERPL CALCULATED.3IONS-SCNC: 17 MMOL/L (ref 7–15)
AST SERPL W P-5'-P-CCNC: 41 U/L (ref 10–35)
BILIRUB SERPL-MCNC: 0.6 MG/DL
BUN SERPL-MCNC: 18.7 MG/DL (ref 8–23)
CALCIUM SERPL-MCNC: 9.8 MG/DL (ref 8.2–9.6)
CHLORIDE SERPL-SCNC: 102 MMOL/L (ref 98–107)
CHOLEST SERPL-MCNC: 139 MG/DL
CREAT SERPL-MCNC: 0.59 MG/DL (ref 0.51–0.95)
DEPRECATED CALCIDIOL+CALCIFEROL SERPL-MC: 36 UG/L (ref 20–75)
DEPRECATED HCO3 PLAS-SCNC: 25 MMOL/L (ref 22–29)
ERYTHROCYTE [DISTWIDTH] IN BLOOD BY AUTOMATED COUNT: 14 % (ref 10–15)
GFR SERPL CREATININE-BSD FRML MDRD: 85 ML/MIN/1.73M2
GLUCOSE SERPL-MCNC: 105 MG/DL (ref 70–99)
HCT VFR BLD AUTO: 41.1 % (ref 35–47)
HDLC SERPL-MCNC: 84 MG/DL
HGB BLD-MCNC: 13.2 G/DL (ref 11.7–15.7)
LDLC SERPL CALC-MCNC: 45 MG/DL
MCH RBC QN AUTO: 30.8 PG (ref 26.5–33)
MCHC RBC AUTO-ENTMCNC: 32.1 G/DL (ref 31.5–36.5)
MCV RBC AUTO: 96 FL (ref 78–100)
NONHDLC SERPL-MCNC: 55 MG/DL
PLATELET # BLD AUTO: 165 10E3/UL (ref 150–450)
POTASSIUM SERPL-SCNC: 4.7 MMOL/L (ref 3.4–5.3)
PROT SERPL-MCNC: 6.8 G/DL (ref 6.4–8.3)
RBC # BLD AUTO: 4.28 10E6/UL (ref 3.8–5.2)
SODIUM SERPL-SCNC: 144 MMOL/L (ref 136–145)
TRIGL SERPL-MCNC: 50 MG/DL
VIT B12 SERPL-MCNC: 337 PG/ML (ref 232–1245)
WBC # BLD AUTO: 6.4 10E3/UL (ref 4–11)

## 2022-11-25 PROCEDURE — G0439 PPPS, SUBSEQ VISIT: HCPCS | Performed by: INTERNAL MEDICINE

## 2022-11-25 PROCEDURE — 85027 COMPLETE CBC AUTOMATED: CPT | Performed by: INTERNAL MEDICINE

## 2022-11-25 PROCEDURE — 80053 COMPREHEN METABOLIC PANEL: CPT | Performed by: INTERNAL MEDICINE

## 2022-11-25 PROCEDURE — 99214 OFFICE O/P EST MOD 30 MIN: CPT | Mod: 25 | Performed by: INTERNAL MEDICINE

## 2022-11-25 PROCEDURE — 99207 PR NO CHARGE LOS: CPT | Performed by: INTERNAL MEDICINE

## 2022-11-25 PROCEDURE — 82607 VITAMIN B-12: CPT | Performed by: INTERNAL MEDICINE

## 2022-11-25 PROCEDURE — 36415 COLL VENOUS BLD VENIPUNCTURE: CPT | Performed by: INTERNAL MEDICINE

## 2022-11-25 PROCEDURE — 80061 LIPID PANEL: CPT | Performed by: INTERNAL MEDICINE

## 2022-11-25 PROCEDURE — 99497 ADVNCD CARE PLAN 30 MIN: CPT | Mod: 33 | Performed by: INTERNAL MEDICINE

## 2022-11-25 PROCEDURE — 82306 VITAMIN D 25 HYDROXY: CPT | Performed by: INTERNAL MEDICINE

## 2022-11-25 NOTE — PROGRESS NOTES
SUBJECTIVE:   Hayde is a 89 year old who presents for Preventive Visit.    Patient has been advised of split billing requirements and indicates understanding: Yes  Are you in the first 12 months of your Medicare coverage?  No    HPI   Ability to successfully perform ADLs: independent in assisted living   Hearing impairment: yes, doesn't wear her hearing aids  Home Safety: no, bathroom handicap accessible, uses walker  Fall Risk: increased, uses walker, but no falls in past 12 months      Have you ever done Advance Care Planning? (For example, a Health Directive, POLST, or a discussion with a medical provider or your loved ones about your wishes): No, advance care planning information given to patient to review.  Patient plans to discuss their wishes with loved ones or provider.        Fall risk  Fallen 2 or more times in the past year?: Yes  Any fall with injury in the past year?: No    Cognitive Screening   1) Repeat 3 items (Leader, Season, Table)    2) Clock draw: She had a hard time performing this test because she is extremely hard of hearing and decided she did not want to continue  3) 3 item recall: As above  Results: The patient's daughter is with her and she has no concerns about the patient's memory    Mini-CogTM Copyright [employer]. Licensed by the author for use in [facility]; reprinted with permission ([email or web address]). All rights reserved.        Reviewed and updated as needed this visit by clinical staff   Tobacco  Allergies  Meds              Reviewed and updated as needed this visit by Provider                 Social History     Tobacco Use     Smoking status: Never     Smokeless tobacco: Never   Substance Use Topics     Alcohol use: Yes     Comment: Alcoholic Drinks/day: rare         Alcohol Use 11/5/2021   Prescreen: >3 drinks/day or >7 drinks/week? No   Prescreen: >3 drinks/day or >7 drinks/week? -         Current providers sharing in care for this patient include:   Patient Care  "Team:  Steve Puente MD as PCP - General (Internal Medicine)  Steve Puente MD as Assigned PCP    The following health maintenance items are reviewed in Epic and correct as of today:  Health Maintenance   Topic Date Due     ZOSTER IMMUNIZATION (1 of 2) Never done     DTAP/TDAP/TD IMMUNIZATION (1 - Tdap) 03/22/2007     INFLUENZA VACCINE (1) 09/01/2022     ANNUAL REVIEW OF HM ORDERS  11/05/2022     MEDICARE ANNUAL WELLNESS VISIT  11/05/2022     FALL RISK ASSESSMENT  11/25/2023     ADVANCE CARE PLANNING  11/05/2026     PHQ-2 (once per calendar year)  Completed     Pneumococcal Vaccine: 65+ Years  Completed     COVID-19 Vaccine  Completed     IPV IMMUNIZATION  Aged Out     MENINGITIS IMMUNIZATION  Aged Out         Pertinent mammograms are reviewed under the imaging tab.    Review of Systems  Constitutional, HEENT, cardiovascular, pulmonary, GI, , musculoskeletal, neuro, skin, endocrine and psych systems are negative, except as otherwise noted.    OBJECTIVE:   /59 (BP Location: Left arm, Patient Position: Sitting, Cuff Size: Adult Large)   Pulse 71   Temp 98.1  F (36.7  C) (Tympanic)   Resp 12   Wt 83.9 kg (185 lb)   SpO2 99%   BMI 30.79 kg/m   Estimated body mass index is 30.79 kg/m  as calculated from the following:    Height as of 2/3/22: 1.651 m (5' 5\").    Weight as of this encounter: 83.9 kg (185 lb).  Physical Exam  See preoperative examination note      ASSESSMENT / PLAN:   1. Annual physical exam  This is a 89-year-old woman with issues as discussed below.  - Vitamin B12; Future  - Vitamin B12  - [place] ADVANCE CARE PLANNING FIRST 30 MINS    2. Primary hypertension  Controlled continue same  - CBC with platelets; Future  - Comprehensive metabolic panel; Future  - CBC with platelets  - Comprehensive metabolic panel    3. Mixed hyperlipidemia  Continues to have  - Lipid panel reflex to direct LDL Fasting; Future  - Lipid panel reflex to direct LDL Fasting    4. Generalized anxiety " "disorder  Stable    5. H/o DVT, provoked, remote    6. Vitamin D deficiency  - Vitamin D Deficiency; Future  - Vitamin D Deficiency    7. TIA (transient ischemic attack)  Remote history no permanent neurological deficit    8. Advance care planning  We had a 16-minute discussion today.  Her daughter would be her medical decision-maker in the event that she is unable to make decisions for herself.  She is not certain if she wants to be full code or DNR.  She has paperwork and she will review this.  She is clear that she would not want ongoing interventions she has no meaningful chance of recovery.  She wants to remain independent as she has now.  - [place] ADVANCE CARE PLANNING FIRST 30 MINS    Patient has been advised of split billing requirements and indicates understanding: Yes    COUNSELING:  Reviewed preventive health counseling, as reflected in patient instructions    BMI:   Estimated body mass index is 30.79 kg/m  as calculated from the following:    Height as of 2/3/22: 1.651 m (5' 5\").    Weight as of this encounter: 83.9 kg (185 lb).     She reports that she has never smoked. She has never used smokeless tobacco.    Appropriate preventive services were discussed with this patient, including applicable screening as appropriate for cardiovascular disease, diabetes, osteopenia/osteoporosis, and glaucoma.  As appropriate for age/gender, discussed screening for colorectal cancer, prostate cancer, breast cancer, and cervical cancer. Checklist reviewing preventive services available has been given to the patient.    Reviewed patients plan of care and provided an AVS. The Basic Care Plan (routine screening as documented in Health Maintenance) for Ruth meets the Care Plan requirement. This Care Plan has been established and reviewed with the Patient.          Steve Puente MD  [facility]    Identified Health Risks:  "

## 2022-11-25 NOTE — PROGRESS NOTES
Preoperative Consultation   Ruth Mack   89 year old  female    Date of visit: 11/25/2022  Physician: Steve Puente MD    This is a preoperative consultation requested by Dr. Felipe in preparation for ectropion repair right lower eyelid on 129/2022 at [facility], fax [phone].       Assessment and Plan   Ruth Mack was seen in preoperative consultation in preparation for ectropion repair right lower eyelid.  This is a low risk surgery and the patient has no increased risk for major cardiac complications.  Please note she does have a history of provoked DVT in the remote past.  She is not currently on anticoagulation and none is recommended perioperatively.  She has no cardiopulmonary contraindication to proposed surgery she may proceed with surgery without further cardiopulmonary testing.      1. Annual physical exam    2. Primary hypertension    3. Mixed hyperlipidemia    4. Generalized anxiety disorder    5. H/o DVT, provoked, remote    6. Vitamin D deficiency    7. TIA (transient ischemic attack)         Patient Profile   Social History     Social History Narrative    Lives alone.  Retired .  Four children. Jeffrey, Igor, Elizabeth and Kelvin.        Past Medical History   Patient Active Problem List   Diagnosis     Mixed hyperlipidemia     Generalized anxiety disorder     Nephrolithiasis     H/o DVT, provoked, remote     Primary hypertension     TIA (transient ischemic attack)       Past Surgical History  She has a past surgical history that includes shoulder surgery (Left) and Cataract Extraction (Bilateral).     History of Present Illness   This 89 year old comes in for preoperative evaluation.  Overall she is generally feeling well.    Recent antiplatelet use: no  Personal or family history of bleeding or clotting disorders: yes Remote history of provoked DVT  Steroid use in the past year: no  Personal or family history of difficulty with anesthesia:  no  Current cardiopulmonary symptoms: no  ADONAY: not known    Review of Systems: A comprehensive review of systems was negative except as noted.     Medications and Allergies   Current Outpatient Medications   Medication Sig Dispense Refill     acetaminophen (TYLENOL) 650 MG CR tablet Take 2 tablets (1,300 mg) by mouth every 8 hours as needed for mild pain       atorvastatin (LIPITOR) 20 MG tablet TAKE 1 TABLET BY MOUTH EVERY DAY 90 tablet 3     busPIRone (BUSPAR) 15 MG tablet Take 1 tablet (15 mg) by mouth daily as needed 30 tablet 9     erythromycin (ROMYCIN) 5 MG/GM ophthalmic ointment Place 0.5 inches into both eyes At Bedtime       metoprolol tartrate (LOPRESSOR) 25 MG tablet Take 1 tablet (25 mg) by mouth 2 times daily 180 tablet 2     VITAMIN D3 25 MCG (1000 UT) tablet TAKE 1 TABLET BY MOUTH EVERY  tablet 1     Allergies   Allergen Reactions     Codeine Other (See Comments)     hallucinations        Family and Social History   Family History   Problem Relation Age of Onset     Cancer Mother      Heart Disease Mother      Heart Disease Father      Cancer Sister      Cancer Daughter         breast     No Known Problems Son      No Known Problems Son      No Known Problems Son         Social History     Tobacco Use     Smoking status: Never     Smokeless tobacco: Never   Substance Use Topics     Alcohol use: Yes     Comment: Alcoholic Drinks/day: rare     Drug use: No        Physical Exam   General Appearance:   No acute distress    /59 (BP Location: Left arm, Patient Position: Sitting, Cuff Size: Adult Large)   Pulse 71   Temp 98.1  F (36.7  C) (Tympanic)   Resp 12   Wt 83.9 kg (185 lb)   SpO2 99%   BMI 30.79 kg/m      HEAD, EARS, NOSE, MOUTH, AND THROAT: Head and face were normal. Hearing was normal to voice and the ears were normal to external exam.   NECK: Neck appearance was normal. There were no neck masses and the thyroid was not enlarged.  RESPIRATORY: Breathing pattern was normal and  the chest moved symmetrically.  Percussion/auscultatory percussion was normal.  Lung sounds were normal and there were no abnormal sounds.  CARDIOVASCULAR: Heart rate and rhythm were normal.  S1 and S2 were normal and there were no extra sounds or murmurs. Peripheral pulses in arms and legs were normal.  Jugular venous pressure was normal.  There was no peripheral edema.  GASTROINTESTINAL: The abdomen was normal in contour.  Bowel sounds were present.  Percussion detected no organ enlargement or tenderness.  Palpation detected no tenderness, mass, or enlarged organs.   MUSCULOSKELETAL: Skeletal configuration was normal and muscle mass was normal for age. Joint appearance was overall normal.  LYMPHATIC: There were no enlarged nodes.  NEUROLOGIC: The patient was alert and oriented to person, place, time, and circumstance. Speech was normal. Cranial nerves were normal. Motor strength was normal for age. The patient was normally coordinated.  PSYCHIATRIC:  Mood and affect were normal and the patient had normal recent and remote memory. The patient's judgment and insight were normal.       Additional Tests   Laboratory: Pending CBC and CMP    Radiology:     Electrocardiogram: EKG in 2019 showed normal sinus rhythm with sinus arrhythmia, personally       Steve Puente MD  Internal Medicine  Contact me at [phone]

## 2023-06-22 ENCOUNTER — NURSE TRIAGE (OUTPATIENT)
Dept: NURSING | Facility: CLINIC | Age: 88
End: 2023-06-22
Payer: COMMERCIAL

## 2023-06-22 DIAGNOSIS — I10 PRIMARY HYPERTENSION: ICD-10-CM

## 2023-06-22 DIAGNOSIS — G45.9 TIA (TRANSIENT ISCHEMIC ATTACK): ICD-10-CM

## 2023-06-22 DIAGNOSIS — E55.9 VITAMIN D DEFICIENCY: ICD-10-CM

## 2023-06-22 DIAGNOSIS — G89.29 CHRONIC BILATERAL LOW BACK PAIN WITHOUT SCIATICA: ICD-10-CM

## 2023-06-22 DIAGNOSIS — M54.50 CHRONIC BILATERAL LOW BACK PAIN WITHOUT SCIATICA: ICD-10-CM

## 2023-06-22 RX ORDER — SENNOSIDES 8.6 MG
1300 CAPSULE ORAL EVERY 8 HOURS PRN
Qty: 90 TABLET | Refills: 4 | Status: SHIPPED | OUTPATIENT
Start: 2023-06-22

## 2023-06-22 RX ORDER — VITAMIN B COMPLEX
1 TABLET ORAL DAILY
Qty: 100 TABLET | Refills: 1 | Status: SHIPPED | OUTPATIENT
Start: 2023-06-22 | End: 2023-12-12

## 2023-06-22 RX ORDER — ATORVASTATIN CALCIUM 20 MG/1
20 TABLET, FILM COATED ORAL DAILY
Qty: 90 TABLET | Refills: 0 | Status: SHIPPED | OUTPATIENT
Start: 2023-06-22 | End: 2023-12-13

## 2023-06-22 RX ORDER — METOPROLOL TARTRATE 25 MG/1
25 TABLET, FILM COATED ORAL 2 TIMES DAILY
Qty: 180 TABLET | Refills: 0 | Status: SHIPPED | OUTPATIENT
Start: 2023-06-22 | End: 2023-10-17

## 2023-06-22 NOTE — TELEPHONE ENCOUNTER
"Routing refill request to provider for review/approval because:  A break in medication  Medication is reported/historical    Acetaminophen  Last Written Prescription Date:  11/5/21  Last Fill Quantity: 0,  # refills: 0     Vitamin D3   Last Written Prescription Date:  9/28/22  Last Fill Quantity: 100,  # refills: 1    Atorvastatin   Last Written Prescription Date:  9/19/22  Last Fill Quantity: 90,  # refills: 3    Metoprolol  Last Written Prescription Date:  7/7/22  Last Fill Quantity: 180,  # refills: 2      Last office visit provider:  11/25/22       Requested Prescriptions   Pending Prescriptions Disp Refills     acetaminophen (TYLENOL) 650 MG CR tablet       Sig: Take 2 tablets (1,300 mg) by mouth every 8 hours as needed for mild pain       Analgesics (Non-Narcotic Tylenol and ASA Only) Passed - 6/22/2023 10:42 AM        Passed - Recent (12 mo) or future (30 days) visit within the authorizing provider's specialty     Patient has had an office visit with the authorizing provider or a provider within the authorizing providers department within the previous 12 mos or has a future within next 30 days. See \"Patient Info\" tab in inbasket, or \"Choose Columns\" in Meds & Orders section of the refill encounter.              Passed - Patient is 7 months old or older     If patient is a peds patient of the age 7 mos -12 years, ok to refill using weight-based dosing.     If >3g daily and/or sig is not \"prn\", check for liver enzymes. If normal in the last year, ok to refill.  If not, refer to the provider.          Passed - Medication is active on med list           metoprolol tartrate (LOPRESSOR) 25 MG tablet 180 tablet 2     Sig: Take 1 tablet (25 mg) by mouth 2 times daily       Beta-Blockers Protocol Passed - 6/22/2023 10:42 AM        Passed - Blood pressure under 140/90 in past 12 months     BP Readings from Last 3 Encounters:   11/25/22 133/59   02/03/22 135/68   02/01/22 100/76                 Passed - Patient is age 6 " "or older        Passed - Recent (12 mo) or future (30 days) visit within the authorizing provider's specialty     Patient has had an office visit with the authorizing provider or a provider within the authorizing providers department within the previous 12 mos or has a future within next 30 days. See \"Patient Info\" tab in inbasket, or \"Choose Columns\" in Meds & Orders section of the refill encounter.              Passed - Medication is active on med list           atorvastatin (LIPITOR) 20 MG tablet 90 tablet 3     Sig: Take 1 tablet (20 mg) by mouth daily       Statins Protocol Passed - 6/22/2023 10:42 AM        Passed - LDL on file in past 12 months     Recent Labs   Lab Test 11/25/22  1131   LDL 45             Passed - No abnormal creatine kinase in past 12 months     No lab results found.             Passed - Recent (12 mo) or future (30 days) visit within the authorizing provider's specialty     Patient has had an office visit with the authorizing provider or a provider within the authorizing providers department within the previous 12 mos or has a future within next 30 days. See \"Patient Info\" tab in inbasket, or \"Choose Columns\" in Meds & Orders section of the refill encounter.              Passed - Medication is active on med list        Passed - Patient is age 18 or older        Passed - No active pregnancy on record        Passed - No positive pregnancy test in past 12 months           vitamin D3 25 mcg (1000 units) tablet 100 tablet 1     Sig: Take 1 tablet (25 mcg) by mouth daily       Vitamin Supplements (Adult) Protocol Passed - 6/22/2023 10:42 AM        Passed - High dose Vitamin D not ordered        Passed - Recent (12 mo) or future (30 days) visit within the authorizing provider's specialty     Patient has had an office visit with the authorizing provider or a provider within the authorizing providers department within the previous 12 mos or has a future within next 30 days. See \"Patient Info\" tab " "in inbasket, or \"Choose Columns\" in Meds & Orders section of the refill encounter.              Passed - Medication is active on med list             ART KING RN 06/22/23 12:51 PM  "

## 2023-06-22 NOTE — TELEPHONE ENCOUNTER
Daughter calling - Consent on file    Nurse Triage SBAR    Is this a 2nd Level Triage? NO    Situation: Patient having left shoulder/upper arm pain.     Background: Has previously had rotator cuff surgery on the same arm, however, unsure if related.     Assessment: Pain started 2 days ago. Pain occurring even when not moving. Denies any SOB or chest pain. Does also have a rash on arm.     Protocol Recommended Disposition:   Go to ED Now    Recommendation: Advised ED now. Daughter is agreeable with plan and will call EMS to get them there. No additional questions.     Dennis Lara RN on 6/22/2023 at 4:23 PM    Reason for Disposition    Age > 40 and no obvious cause and pain even when not moving the arm  (Exception: Pain is clearly made worse by moving arm or bending neck.)    Additional Information    Negative: Shock suspected (e.g., cold/pale/clammy skin, too weak to stand, low BP, rapid pulse)    Negative: Similar pain previously and it was from 'heart attack'    Negative: Similar pain previously and it was from 'angina' and not relieved by nitroglycerin    Negative: Sounds like a life-threatening emergency to the triager    Negative: Difficulty breathing or unusual sweating (e.g., sweating without exertion)    Negative: Pain lasting > 5 minutes and pain also present in chest  (Exception: Pain is clearly made worse by movement.)    Protocols used: SHOULDER PAIN-A-OH

## 2023-06-23 ENCOUNTER — OFFICE VISIT (OUTPATIENT)
Dept: URGENT CARE | Facility: URGENT CARE | Age: 88
End: 2023-06-23
Payer: COMMERCIAL

## 2023-06-23 VITALS
SYSTOLIC BLOOD PRESSURE: 169 MMHG | BODY MASS INDEX: 29.95 KG/M2 | OXYGEN SATURATION: 97 % | TEMPERATURE: 97.5 F | HEART RATE: 73 BPM | DIASTOLIC BLOOD PRESSURE: 97 MMHG | WEIGHT: 180 LBS

## 2023-06-23 DIAGNOSIS — B02.9 HERPES ZOSTER WITHOUT COMPLICATION: Primary | ICD-10-CM

## 2023-06-23 PROCEDURE — 99213 OFFICE O/P EST LOW 20 MIN: CPT | Performed by: PHYSICIAN ASSISTANT

## 2023-06-23 RX ORDER — VALACYCLOVIR HYDROCHLORIDE 1 G/1
1000 TABLET, FILM COATED ORAL 3 TIMES DAILY
Qty: 21 TABLET | Refills: 0 | Status: SHIPPED | OUTPATIENT
Start: 2023-06-23 | End: 2023-08-09

## 2023-06-23 ASSESSMENT — PAIN SCALES - GENERAL: PAINLEVEL: EXTREME PAIN (8)

## 2023-06-24 NOTE — PROGRESS NOTES
Assessment & Plan     1. Herpes zoster without complication  Examination is consistent with herpes zoster.   The nature of herpes zoster is explained carefully. Lesions should be compressed/soaked with saline, topical antibiotic ointment to any infected lesions; Aloe Vera cream may help minor local pain. Intervention with antiviral agents is extremely helpful early in the course of the disease, less helpful after 2-3 days of symptoms. Postherpetic neuralgia is explained; this may occur especially in the elderly despite every attempt at prevention. Prescription for valacyclovir (Valtrex), which may shorten the course of acute symptoms and reduce the incidence of later neuralgia. The patient understands these issues, and will call as needed for further care.    - valACYclovir (VALTREX) 1000 mg tablet; Take 1 tablet (1,000 mg) by mouth 3 times daily for 7 days  Dispense: 21 tablet; Refill: 0        Return in about 1 week (around 6/30/2023), or if symptoms worsen or fail to improve.    Diagnosis and treatment plan was reviewed with patient and/or family.   We went over any labs or imaging. Discussed worsening symptoms or little to no relief despite treatment plan to follow-up with PCP or return to clinic.  Patient verbalizes understanding. All questions were addressed and answered.     Serena Nieto PA-C  [facility]    CHIEF COMPLAINT:   Chief Complaint   Patient presents with     Urgent Care     Pt states that her left shoulder hurts dull ache, down the arm and back, pt has rash on left inner elbow as well-- started Tuesday.     Kimberly Lock is a 89 year old female who presents to clinic today for evaluation of left shoulder pain. Symptoms started 3 days ago. The following day, she developed lesions of her upper arm which are now travelling further down her arm. Area is painful and burning. She did not have any injury to the area.     No fever or chills.     Past Medical History:    Diagnosis Date     Deep vein thrombosis (H)      Generalized anxiety disorder      Hyperlipidemia      Hypertension      Kidney stone      Nephrolithiasis      Obesity      TIA (transient ischemic attack)      Past Surgical History:   Procedure Laterality Date     CATARACT EXTRACTION Bilateral      SHOULDER SURGERY Left      Social History     Tobacco Use     Smoking status: Never     Smokeless tobacco: Never   Substance Use Topics     Alcohol use: Yes     Comment: Alcoholic Drinks/day: rare     Current Outpatient Medications   Medication     acetaminophen (TYLENOL) 650 MG CR tablet     atorvastatin (LIPITOR) 20 MG tablet     busPIRone (BUSPAR) 15 MG tablet     metoprolol tartrate (LOPRESSOR) 25 MG tablet     valACYclovir (VALTREX) 1000 mg tablet     vitamin D3 25 mcg (1000 units) tablet     erythromycin (ROMYCIN) 5 MG/GM ophthalmic ointment     No current facility-administered medications for this visit.     Allergies   Allergen Reactions     Codeine Other (See Comments)     hallucinations       10 point ROS of systems were all negative except for pertinent positives noted in my HPI.      Exam:   BP (!) 169/97 (BP Location: Right arm, Patient Position: Sitting, Cuff Size: Adult Regular)   Pulse 73   Temp 97.5  F (36.4  C) (Oral)   Wt 81.6 kg (180 lb)   SpO2 97%   BMI 29.95 kg/m    Constitutional: healthy, alert and no distress  Head: Normocephalic, atraumatic.  Skin: left arm: Shingles: C6/C7 dermatome has Inflamed patches of clear fluid-filled vesicles.  Neurologic: Speech clear, gait normal. Moves all extremities.    No results found for any visits on 06/23/23.

## 2023-06-26 ENCOUNTER — NURSE TRIAGE (OUTPATIENT)
Dept: NURSING | Facility: CLINIC | Age: 88
End: 2023-06-26
Payer: COMMERCIAL

## 2023-06-26 NOTE — TELEPHONE ENCOUNTER
Nurse Triage SBAR    Situation:    follow up    Background:   -daughter calling, It is okay to leave a detailed message at this number.   -consent to communicate is on file    Assessment:   -went to  on Friday  -Dx of shingles  -taking tylenol  -has shoulder pain and blisters on left arm/hand  -caller is not with the patient, RN unable to triage  -they would like to know options for pain control as wells as contagiousness  -RN able to answer all questions base on protocol care advice    Recommendation:   -call back with any other questions or concerns  -call back when you are with the patient if you would like triage     CORAL ROBERSON, RN on 6/26/2023 at 11:53 AM    Reason for Disposition    Shingles, questions about    [1] Shingles rash already diagnosed and [2] taking antiviral medication    Protocols used: SHINGLES-A-AH

## 2023-08-09 ENCOUNTER — TELEPHONE (OUTPATIENT)
Dept: INTERNAL MEDICINE | Facility: CLINIC | Age: 88
End: 2023-08-09

## 2023-08-09 ENCOUNTER — OFFICE VISIT (OUTPATIENT)
Dept: INTERNAL MEDICINE | Facility: CLINIC | Age: 88
End: 2023-08-09
Payer: COMMERCIAL

## 2023-08-09 ENCOUNTER — HOSPITAL ENCOUNTER (OUTPATIENT)
Dept: ULTRASOUND IMAGING | Facility: HOSPITAL | Age: 88
Discharge: HOME | End: 2023-08-09
Attending: INTERNAL MEDICINE | Admitting: INTERNAL MEDICINE
Payer: COMMERCIAL

## 2023-08-09 VITALS
SYSTOLIC BLOOD PRESSURE: 111 MMHG | HEIGHT: 65 IN | DIASTOLIC BLOOD PRESSURE: 67 MMHG | OXYGEN SATURATION: 94 % | BODY MASS INDEX: 29.99 KG/M2 | RESPIRATION RATE: 21 BRPM | TEMPERATURE: 98.6 F | WEIGHT: 180 LBS | HEART RATE: 76 BPM

## 2023-08-09 DIAGNOSIS — I82.5Y2 CHRONIC DEEP VEIN THROMBOSIS (DVT) OF PROXIMAL VEIN OF LEFT LOWER EXTREMITY (H): ICD-10-CM

## 2023-08-09 DIAGNOSIS — M79.89 LEFT LEG SWELLING: Primary | ICD-10-CM

## 2023-08-09 DIAGNOSIS — L97.329 VENOUS ULCER OF ANKLE, LEFT (H): ICD-10-CM

## 2023-08-09 DIAGNOSIS — I83.023 VENOUS ULCER OF ANKLE, LEFT (H): ICD-10-CM

## 2023-08-09 DIAGNOSIS — M79.89 LEFT LEG SWELLING: ICD-10-CM

## 2023-08-09 PROCEDURE — 99214 OFFICE O/P EST MOD 30 MIN: CPT | Performed by: INTERNAL MEDICINE

## 2023-08-09 PROCEDURE — 93971 EXTREMITY STUDY: CPT | Mod: LT

## 2023-08-09 RX ORDER — APIXABAN 5 MG (74)
KIT ORAL
Qty: 74 EACH | Refills: 0 | Status: SHIPPED | OUTPATIENT
Start: 2023-08-09 | End: 2023-08-27

## 2023-08-09 ASSESSMENT — PAIN SCALES - GENERAL: PAINLEVEL: MODERATE PAIN (4)

## 2023-08-09 NOTE — PROGRESS NOTES
Office Visit - Follow Up   Ruth Mack   89 year old female    Date of Visit: 8/9/2023    Chief Complaint   Patient presents with    Derm Problem     Possible ulcers on left leg x1 week. Patient has had shingles x6 weeks on left shoulder/arm.        Assessment and Plan   1. Left leg swelling  This is concerning for a DVT given the discrepancy between her 2 legs, history of DVT of the same leg.  I would like to get an ultrasound today.  If she has a DVT I have taken the liberty of sending in Eliquis to her pharmacy.  I will be available by phone until about 4 PM.  After 4 PM I will be unavailable until Monday.  We discussed wound care and will have our nursing staff dressed the wound and compress the leg with an Ace bandage and we discussed importance of elevation.  Hemodynamically she is stable and there is no evidence that she has a pulmonary embolus or more complicated process.  - US Lower Extremity Venous Duplex Left; Future  - Apixaban Starter Pack (ELIQUIS DVT/PE STARTER PACK) 5 MG TBPK; Take 10 mg by mouth 2 times daily for 7 days, THEN 5 mg 2 times daily for 23 days.  Dispense: 74 each; Refill: 0    2. Venous ulcer of ankle, left (H)    3. 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  Dispense: 74 each; Refill: 0      Return in about 1 week (around 8/16/2023) for Follow up.     History of Present Illness   This 89 year old woman comes in with left leg swelling and ulceration.  She has a history of injury to the left leg with subsequent DVT.  She was on warfarin but she is no longer on warfarin.  She is not having a lot of pain.  She is having no breathing difficulty.  Had shingles of her left arm about 6 weeks ago which seems to have recovered.       Physical Exam   General  "Appearance:   No acute distress    /67   Pulse 76   Temp 98.6  F (37  C) (Tympanic)   Resp 21   Ht 1.651 m (5' 5\")   Wt 81.6 kg (180 lb)   LMP  (LMP Unknown)   SpO2 94%   BMI 29.95 kg/m      Marked swelling of the left lower extremity with venous ulceration no evidence of cellulitis or infection.     Additional Information   Current Outpatient Medications   Medication Sig Dispense Refill    acetaminophen (TYLENOL) 650 MG CR tablet Take 2 tablets (1,300 mg) by mouth every 8 hours as needed for mild pain 90 tablet 4    Apixaban Starter Pack (ELIQUIS DVT/PE STARTER PACK) 5 MG TBPK Take 10 mg by mouth 2 times daily for 7 days, THEN 5 mg 2 times daily for 23 days. 74 each 0    atorvastatin (LIPITOR) 20 MG tablet Take 1 tablet (20 mg) by mouth daily 90 tablet 0    busPIRone (BUSPAR) 15 MG tablet Take 1 tablet (15 mg) by mouth daily as needed 30 tablet 9    metoprolol tartrate (LOPRESSOR) 25 MG tablet Take 1 tablet (25 mg) by mouth 2 times daily 180 tablet 0    vitamin D3 25 mcg (1000 units) tablet Take 1 tablet (25 mcg) by mouth daily 100 tablet 1       Time:      Triston Hernandez submitted by the patient for this visit:  General Questionnaire (Submitted on 8/9/2023)  Chief Complaint: Chronic problems general questions HPI Form  How many servings of fruits and vegetables do you eat daily?: 0-1  On average, how many sweetened beverages do you drink each day (Examples: soda, juice, sweet tea, etc.  Do NOT count diet or artificially sweetened beverages)?: 1  How many minutes a day do you exercise enough to make your heart beat faster?: 9 or less  How many days a week do you exercise enough to make your heart beat faster?: 3 or less  How many days per week do you miss taking your medication?: 0  General Concern (Submitted on 8/9/2023)  Chief Complaint: Chronic problems general questions HPI Form  What is the reason for your visit today?: ulcers on left leg  When did your symptoms begin?: 1-2 weeks " ago  What are your symptoms?: weeping painful sores  How would you describe these symptoms?: Severe  Are your symptoms:: Worsening  Have you had these symptoms before?: No  Is there anything that makes you feel worse?: no  Is there anything that makes you feel better?: no

## 2023-08-16 ENCOUNTER — OFFICE VISIT (OUTPATIENT)
Dept: INTERNAL MEDICINE | Facility: CLINIC | Age: 88
End: 2023-08-16
Payer: COMMERCIAL

## 2023-08-16 VITALS
SYSTOLIC BLOOD PRESSURE: 116 MMHG | HEART RATE: 74 BPM | RESPIRATION RATE: 16 BRPM | DIASTOLIC BLOOD PRESSURE: 65 MMHG | OXYGEN SATURATION: 96 % | TEMPERATURE: 97.7 F

## 2023-08-16 DIAGNOSIS — I82.402 DVT, LOWER EXTREMITY, RECURRENT, LEFT (H): Primary | ICD-10-CM

## 2023-08-16 PROCEDURE — 99214 OFFICE O/P EST MOD 30 MIN: CPT | Performed by: INTERNAL MEDICINE

## 2023-08-16 ASSESSMENT — PAIN SCALES - GENERAL: PAINLEVEL: NO PAIN (0)

## 2023-08-16 NOTE — PROGRESS NOTES
Office Visit - Follow Up   Ruth Mack   89 year old female    Date of Visit: 8/16/2023    Chief Complaint   Patient presents with    office visit     Recheck Medication     Pt is here for follow up         Assessment and Plan   1. DVT, lower extremity, recurrent, left (H)  Continue Eliquis, will see if we can get her patient assistance program to help cover the cost of Eliquis as it is not currently affordable.  Otherwise warfarin would be an option but we will need to start this with at least a week left of her Eliquis in order to bridge.  Messages sent to clinical pharmacist and clinical .    Return in about 2 weeks (around 8/30/2023) for Follow up.     History of Present Illness   This 89 year old woman has recurrent DVT.  She is on Eliquis but it is a bit expensive.  She has been elevating and compressing her legs and swelling has improved slightly.       Physical Exam   General Appearance:   No acute distress    /65 (BP Location: Left arm, Patient Position: Sitting, Cuff Size: Adult Regular)   Pulse 74   Temp 97.7  F (36.5  C) (Oral)   Resp 16   LMP  (LMP Unknown)   SpO2 96%     Left leg is swollen     Additional Information   Current Outpatient Medications   Medication Sig Dispense Refill    acetaminophen (TYLENOL) 650 MG CR tablet Take 2 tablets (1,300 mg) by mouth every 8 hours as needed for mild pain 90 tablet 4    Apixaban Starter Pack (ELIQUIS DVT/PE STARTER PACK) 5 MG TBPK Take 10 mg by mouth 2 times daily for 7 days, THEN 5 mg 2 times daily for 23 days. 74 each 0    atorvastatin (LIPITOR) 20 MG tablet Take 1 tablet (20 mg) by mouth daily 90 tablet 0    busPIRone (BUSPAR) 15 MG tablet Take 1 tablet (15 mg) by mouth daily as needed 30 tablet 9    metoprolol tartrate (LOPRESSOR) 25 MG tablet Take 1 tablet (25 mg) by mouth 2 times daily 180 tablet 0    vitamin D3 25 mcg (1000 units) tablet Take 1 tablet (25 mcg) by mouth daily 100 tablet 1       Time:      Steve Puente,  MD

## 2023-08-16 NOTE — Clinical Note
This patient is looking for assistance for Eliquis long-term for recurrent DVT.  I know there might be a pool I could send this note to see if they can help her with the patient assistance programs or other programs.  Is this something you could help with?

## 2023-08-17 ENCOUNTER — MYC MEDICAL ADVICE (OUTPATIENT)
Dept: INTERNAL MEDICINE | Facility: CLINIC | Age: 88
End: 2023-08-17
Payer: COMMERCIAL

## 2023-08-17 DIAGNOSIS — I82.402 DVT, LOWER EXTREMITY, RECURRENT, LEFT (H): Primary | ICD-10-CM

## 2023-09-01 ENCOUNTER — NURSE TRIAGE (OUTPATIENT)
Dept: NURSING | Facility: CLINIC | Age: 88
End: 2023-09-01
Payer: COMMERCIAL

## 2023-09-01 NOTE — TELEPHONE ENCOUNTER
"Triage Call:    Caller: Daughter calling for patient and consent is on file.       Patient has developed a cough in the last few day and is more \"phlemy\" today.    She was just with her, but has left where the patient is at.  Advised that she would need to be with the patient so that we could triage symptoms to give advice on next steps . Did advise that COVID cases are on the rise again.  Advised that triage nurse line is open all weekend long.   Caller verbalized understanding of instructions and questions answered.      Olga Duncan RN on 9/1/2023 at 2:34 PM      Reason for Disposition    Caller is not with the adult (patient) AND patient has probable NON-URGENT symptoms    Protocols used: Information Only Call - No Triage-A-OH    "

## 2023-09-05 ENCOUNTER — TELEPHONE (OUTPATIENT)
Dept: INTERNAL MEDICINE | Facility: CLINIC | Age: 88
End: 2023-09-05
Payer: COMMERCIAL

## 2023-09-05 NOTE — TELEPHONE ENCOUNTER
General Call      Reason for Call:  pt has an upcoming dental appt  (cleaning) and is wondering if she should take her Eliqus  Please advise    What are your questions or concerns:  n/a    Date of last appointment with provider: n/a    Could we send this information to you in [employer] or would you prefer to receive a phone call?:   Patient would prefer a phone call   Okay to leave a detailed message?: Yes at Cell number on file:    Telephone Information:   Mobile [phone]

## 2023-09-06 NOTE — TELEPHONE ENCOUNTER
Spoke to Elizabeth regarding Eliquis questions. Relayed message from provider and pharmacist.    Elizabeth verbalized understanding, had no further questions or concerns at this time.

## 2023-10-16 DIAGNOSIS — F41.1 GENERALIZED ANXIETY DISORDER: ICD-10-CM

## 2023-10-16 DIAGNOSIS — I10 PRIMARY HYPERTENSION: ICD-10-CM

## 2023-10-17 RX ORDER — BUSPIRONE HYDROCHLORIDE 15 MG/1
15 TABLET ORAL DAILY PRN
Qty: 90 TABLET | Refills: 3 | Status: SHIPPED | OUTPATIENT
Start: 2023-10-17

## 2023-10-17 RX ORDER — METOPROLOL TARTRATE 25 MG/1
25 TABLET, FILM COATED ORAL 2 TIMES DAILY
Qty: 180 TABLET | Refills: 0 | Status: SHIPPED | OUTPATIENT
Start: 2023-10-17 | End: 2024-01-12

## 2023-10-20 ENCOUNTER — NURSE TRIAGE (OUTPATIENT)
Dept: NURSING | Facility: CLINIC | Age: 88
End: 2023-10-20
Payer: COMMERCIAL

## 2023-10-20 ENCOUNTER — MYC MEDICAL ADVICE (OUTPATIENT)
Dept: INTERNAL MEDICINE | Facility: CLINIC | Age: 88
End: 2023-10-20

## 2023-10-20 ENCOUNTER — OFFICE VISIT (OUTPATIENT)
Dept: URGENT CARE | Facility: URGENT CARE | Age: 88
End: 2023-10-20
Payer: COMMERCIAL

## 2023-10-20 VITALS
TEMPERATURE: 98 F | HEART RATE: 78 BPM | OXYGEN SATURATION: 98 % | SYSTOLIC BLOOD PRESSURE: 152 MMHG | DIASTOLIC BLOOD PRESSURE: 80 MMHG | RESPIRATION RATE: 20 BRPM

## 2023-10-20 DIAGNOSIS — I82.5Y2 CHRONIC DEEP VEIN THROMBOSIS (DVT) OF PROXIMAL VEIN OF LEFT LOWER EXTREMITY (H): ICD-10-CM

## 2023-10-20 DIAGNOSIS — L03.116 CELLULITIS OF LEFT LOWER EXTREMITY: Primary | ICD-10-CM

## 2023-10-20 PROCEDURE — 99214 OFFICE O/P EST MOD 30 MIN: CPT | Performed by: NURSE PRACTITIONER

## 2023-10-20 RX ORDER — CEPHALEXIN 500 MG/1
500 CAPSULE ORAL 2 TIMES DAILY
Qty: 20 CAPSULE | Refills: 0 | Status: SHIPPED | OUTPATIENT
Start: 2023-10-20 | End: 2023-10-30

## 2023-10-20 NOTE — PROGRESS NOTES
SUBJECTIVE:   Ruth Mack is a 89 year old female presenting with a chief complaint of   Chief Complaint   Patient presents with    Urgent Care     L leg swelling pain pt thinks is a blot clots        She is an established patient of [facility].    She is here today due to her MD wanting her to get checked out. She has lower leg swelling that started for about one week. She has a history of clots, she is here with her daughter and she states she saw her leg and there is a lot more swelling. She also has some blister that are intact and are not draining. She has been on eliquis for over a month, she did have the DVT of the femoral vein in the beginning of August.     Review of Systems    Past Medical History:   Diagnosis Date    Deep vein thrombosis (H)     Generalized anxiety disorder     Hyperlipidemia     Hypertension     Kidney stone     Nephrolithiasis     Obesity     TIA (transient ischemic attack)      Family History   Problem Relation Age of Onset    Cancer Mother     Heart Disease Mother     Heart Disease Father     Cancer Sister     Cancer Daughter         breast    No Known Problems Son     No Known Problems Son     No Known Problems Son      Current Outpatient Medications   Medication Sig Dispense Refill    acetaminophen (TYLENOL) 650 MG CR tablet Take 2 tablets (1,300 mg) by mouth every 8 hours as needed for mild pain 90 tablet 4    apixaban ANTICOAGULANT (ELIQUIS ANTICOAGULANT) 5 MG tablet Take 1 tablet (5 mg) by mouth 2 times daily 180 tablet 4    atorvastatin (LIPITOR) 20 MG tablet Take 1 tablet (20 mg) by mouth daily 90 tablet 0    busPIRone (BUSPAR) 15 MG tablet TAKE 1 TABLET BY MOUTH EVERY DAY AS NEEDED 90 tablet 3    metoprolol tartrate (LOPRESSOR) 25 MG tablet TAKE 1 TABLET BY MOUTH TWICE A  tablet 0    vitamin D3 25 mcg (1000 units) tablet Take 1 tablet (25 mcg) by mouth daily 100 tablet 1     Social History     Tobacco Use    Smoking status: Never    Smokeless tobacco: Never    Substance Use Topics    Alcohol use: Yes     Comment: Alcoholic Drinks/day: rare       OBJECTIVE  BP (!) 152/80   Pulse 78   Temp 98  F (36.7  C)   Resp 20   LMP  (LMP Unknown)   SpO2 98%     Physical Exam  Skin:     Comments: Right lower extremity large serous filled blister, drained and covered with dressing, noted redness and tenderness to surrounding area.    Neurological:      Mental Status: She is alert.         Labs:  No results found for this or any previous visit (from the past 24 hour(s)).        ASSESSMENT:      ICD-10-CM    1. Cellulitis of left lower extremity  L03.116 cephALEXin (KEFLEX) 500 MG capsule      2. H/o DVT, provoked, remote  I82.5Y2              PLAN:    -- will treat cellulitis and then have patient follow up in 2-3 days     - unlikely DVT due to  symptoms and also patient is anticoagulated     Followup:    If not improving or if condition worsens, follow up with your Primary Care Provider    There are no Patient Instructions on file for this visit.

## 2023-10-20 NOTE — TELEPHONE ENCOUNTER
Nurse Triage SBAR    Is this a 2nd Level Triage? YES, LICENSED PRACTITIONER REVIEW IS REQUIRED    Situation: Swelling in left leg.    Background: Daughter says for the last week her lower left leg has had increased swelling. Now she is visiting her mom today and is concerned because it looks very swollen.Hx of recurrent DVT and is currently on Eliquis.     Assessment: Daughter says that the pt's leg is swollen up past her knee and looks worse than when they took her in for evaluation in August. Pt denies any pain. Has two large blisters above her sock line. Says they are not open (Will send a picture via [employer] message to PCP). Pt denies any chest pain or breathing difficulty.     Protocol Recommended Disposition:   Go to ED/UCC now (or to office with PCP triage).    Recommendation: Will route message to PCP for Second Level Triage.     Routed to provider    Does the patient meet one of the following criteria for ADS visit consideration? 16+ years old, with an [facility] PCP     TIP  Providers, please consider if this condition is appropriate for management at one of our Acute and Diagnostic Services sites.     If patient is a good candidate, please use dotphrase <dot>triageresponse and select Refer to ADS to document.    Amelie Ventura, RN, BSN  [facility]   Triage Nurse Advisor    Reason for Disposition   SEVERE swelling (e.g., swelling extends above knee, entire leg is swollen, weeping fluid)    Additional Information   Negative: Difficulty breathing at rest   Negative: Entire foot is cool or blue in comparison to other side   Negative: Chest pain   Negative: Followed an insect bite and has localized swelling (e.g., small area of puffy or swollen skin)   Negative: Followed a knee injury   Negative: Ankle or foot injury   Negative: Pregnant with leg swelling or edema   Negative: Sounds like a life-threatening emergency to the triager    Protocols used: Leg Swelling and Edema-A-OH

## 2023-10-20 NOTE — TELEPHONE ENCOUNTER
Due to degree of leg swelling , I would recommend in person evaluation today.  If she is on Eliquis and takes it consistently, DVT is less likely, but need eval for other causes of swelling. They can try Urgency room , then follow up with PCP next week.

## 2023-10-20 NOTE — TELEPHONE ENCOUNTER
Spoke to daughter Elizabeth (CTC on file) and relayed provider message. Elizabeth stated she would talk to pt and will try to go to either UC or Urgency Room for evaluation. Elizabeth did not have any additional questions at this time.

## 2023-10-20 NOTE — PROGRESS NOTES
SUBJECTIVE:   Ruth Mack is a 89 year old female presenting with a chief complaint of   Chief Complaint   Patient presents with     Urgent Care     L leg swelling pain pt thinks is a blot clots        She is { New/Established:427771} patient of [facility].    { Conditions:252011}    Review of Systems    Past Medical History:   Diagnosis Date     Deep vein thrombosis (H)      Generalized anxiety disorder      Hyperlipidemia      Hypertension      Kidney stone      Nephrolithiasis      Obesity      TIA (transient ischemic attack)      Family History   Problem Relation Age of Onset     Cancer Mother      Heart Disease Mother      Heart Disease Father      Cancer Sister      Cancer Daughter         breast     No Known Problems Son      No Known Problems Son      No Known Problems Son      Current Outpatient Medications   Medication Sig Dispense Refill     acetaminophen (TYLENOL) 650 MG CR tablet Take 2 tablets (1,300 mg) by mouth every 8 hours as needed for mild pain 90 tablet 4     apixaban ANTICOAGULANT (ELIQUIS ANTICOAGULANT) 5 MG tablet Take 1 tablet (5 mg) by mouth 2 times daily 180 tablet 4     atorvastatin (LIPITOR) 20 MG tablet Take 1 tablet (20 mg) by mouth daily 90 tablet 0     busPIRone (BUSPAR) 15 MG tablet TAKE 1 TABLET BY MOUTH EVERY DAY AS NEEDED 90 tablet 3     metoprolol tartrate (LOPRESSOR) 25 MG tablet TAKE 1 TABLET BY MOUTH TWICE A  tablet 0     vitamin D3 25 mcg (1000 units) tablet Take 1 tablet (25 mcg) by mouth daily 100 tablet 1     Social History     Tobacco Use     Smoking status: Never     Smokeless tobacco: Never   Substance Use Topics     Alcohol use: Yes     Comment: Alcoholic Drinks/day: rare       OBJECTIVE  BP (!) 152/80   Pulse 78   Temp 98  F (36.7  C)   Resp 20   LMP  (LMP Unknown)   SpO2 98%     Physical Exam    Labs:  No results found for this or any previous visit (from the past 24 hour(s)).    {XRay was/was not done (Optional):669607}    ASSESSMENT:    No  diagnosis found.     Medical Decision Making:    Differential Diagnosis:  {UC Differential Choices:939296}    Serious Comorbid Conditions:  {UC Serious Comorbid Conditions:492189}    PLAN:    {UC Plan Choices:093898}    Followup:    {UC Followup:130143}    There are no Patient Instructions on file for this visit.

## 2023-10-23 ENCOUNTER — OFFICE VISIT (OUTPATIENT)
Dept: FAMILY MEDICINE | Facility: CLINIC | Age: 88
End: 2023-10-23
Payer: COMMERCIAL

## 2023-10-23 ENCOUNTER — TELEPHONE (OUTPATIENT)
Dept: INTERNAL MEDICINE | Facility: CLINIC | Age: 88
End: 2023-10-23

## 2023-10-23 VITALS
TEMPERATURE: 97.1 F | HEART RATE: 65 BPM | SYSTOLIC BLOOD PRESSURE: 136 MMHG | WEIGHT: 180 LBS | DIASTOLIC BLOOD PRESSURE: 64 MMHG | HEIGHT: 65 IN | OXYGEN SATURATION: 96 % | RESPIRATION RATE: 30 BRPM | BODY MASS INDEX: 29.99 KG/M2

## 2023-10-23 DIAGNOSIS — L97.821 VENOUS STASIS ULCER OF OTHER PART OF LEFT LOWER LEG LIMITED TO BREAKDOWN OF SKIN WITHOUT VARICOSE VEINS (H): Primary | ICD-10-CM

## 2023-10-23 DIAGNOSIS — L03.90 CELLULITIS, UNSPECIFIED CELLULITIS SITE: ICD-10-CM

## 2023-10-23 DIAGNOSIS — I87.2 VENOUS STASIS ULCER OF OTHER PART OF LEFT LOWER LEG LIMITED TO BREAKDOWN OF SKIN WITHOUT VARICOSE VEINS (H): Primary | ICD-10-CM

## 2023-10-23 PROCEDURE — 99213 OFFICE O/P EST LOW 20 MIN: CPT | Performed by: NURSE PRACTITIONER

## 2023-10-23 ASSESSMENT — ANXIETY QUESTIONNAIRES
IF YOU CHECKED OFF ANY PROBLEMS ON THIS QUESTIONNAIRE, HOW DIFFICULT HAVE THESE PROBLEMS MADE IT FOR YOU TO DO YOUR WORK, TAKE CARE OF THINGS AT HOME, OR GET ALONG WITH OTHER PEOPLE: VERY DIFFICULT
6. BECOMING EASILY ANNOYED OR IRRITABLE: MORE THAN HALF THE DAYS
5. BEING SO RESTLESS THAT IT IS HARD TO SIT STILL: NOT AT ALL
4. TROUBLE RELAXING: MORE THAN HALF THE DAYS
3. WORRYING TOO MUCH ABOUT DIFFERENT THINGS: SEVERAL DAYS
GAD7 TOTAL SCORE: 7
1. FEELING NERVOUS, ANXIOUS, OR ON EDGE: SEVERAL DAYS
2. NOT BEING ABLE TO STOP OR CONTROL WORRYING: SEVERAL DAYS
7. FEELING AFRAID AS IF SOMETHING AWFUL MIGHT HAPPEN: NOT AT ALL
GAD7 TOTAL SCORE: 7

## 2023-10-23 ASSESSMENT — PATIENT HEALTH QUESTIONNAIRE - PHQ9
SUM OF ALL RESPONSES TO PHQ QUESTIONS 1-9: 9
10. IF YOU CHECKED OFF ANY PROBLEMS, HOW DIFFICULT HAVE THESE PROBLEMS MADE IT FOR YOU TO DO YOUR WORK, TAKE CARE OF THINGS AT HOME, OR GET ALONG WITH OTHER PEOPLE: SOMEWHAT DIFFICULT
SUM OF ALL RESPONSES TO PHQ QUESTIONS 1-9: 9

## 2023-10-23 NOTE — TELEPHONE ENCOUNTER
10/23 I looked at schedule pt is scheduled for a appt today with NP from , and has an appt on Oct 30th with Kwame

## 2023-10-23 NOTE — PROGRESS NOTES
"  Assessment & Plan     Venous stasis ulcer of other part of left lower leg limited to breakdown of skin without varicose veins (H)  improved    Cellulitis, unspecified cellulitis site  improved     BMI:   Estimated body mass index is 29.95 kg/m  as calculated from the following:    Height as of this encounter: 1.651 m (5' 5\").    Weight as of this encounter: 81.6 kg (180 lb).         Jyothi Bird NP  [facility] VELIA Lock is a 89 year old, presenting for the following health issues:  Urgent Care        10/23/2023    11:17 AM   Additional Questions   Roomed by Glen[facility]   Accompanied by Daughter Elizabeth       History of Present Illness       Reason for visit:  Urgent care follow-medication    She eats 2-3 servings of fruits and vegetables daily.She consumes 1 sweetened beverage(s) daily.She exercises with enough effort to increase her heart rate 9 or less minutes per day.  She exercises with enough effort to increase her heart rate 3 or less days per week.   She is taking medications regularly.       ED/UC Followup:    Facility:  [facility]  Date of visit: 10/20/2023  Reason for visit: cellulitis  Current Status: still has swelling, fluid is draining      Review of Systems         Objective    /64   Pulse 65   Temp 97.1  F (36.2  C) (Tympanic)   Resp 30   Ht 1.651 m (5' 5\")   Wt 81.6 kg (180 lb)   LMP  (LMP Unknown)   SpO2 96%   BMI 29.95 kg/m    Body mass index is 29.95 kg/m .  Physical Exam   GENERAL: healthy, alert and no distress  CV: regular rate and rhythm, normal S1 S2, no S3 or S4, no murmur, click or rub, no peripheral edema and peripheral pulses strong  MS: no gross musculoskeletal defects noted, no edema  SKIN: no suspicious lesions or rashes and improved cellulitis, redness and edema   PSYCH: mentation appears normal, affect normal/bright                      "

## 2023-10-23 NOTE — TELEPHONE ENCOUNTER
----- Message from Steve Puente MD sent at 10/23/2023  7:07 AM CDT -----  Whatever seems to work.  Thanks!  ----- Message -----  From: Sol Saravia  Sent: 10/23/2023   7:02 AM CDT  To: Steve Puente MD    Okay to double book Wednesday?  ----- Message -----  From: Steve Puente MD  Sent: 10/22/2023   6:42 PM CDT  To: [facility] Scheduling Registration Pool    See message thanks!  ----- Message -----  From: Jyothi Bird NP  Sent: 10/20/2023   4:47 PM CDT  To: Steve Puente MD    I saw this patient in UC and she has cellulitis and I drained the serous fluid that was collecting and I have dressed the wound and then placed her on keflex, I did not find it necessary for DVT work up as she is anti coagulated and the presentation was not consistent with DVT.     I would like her to be seen to be reevaluated, she would like to see you I would think Tuesday or Wednesday if you can make that work, worse case I am going to place her on my schedule as I am a primary care provider helping out in UC today to re-evaluate her    Please have your staff reach out to the patients daughter, Elizabeth and let her know  Jyothi Bird NP on 10/20/2023 at 4:46 PM

## 2023-10-26 ENCOUNTER — PATIENT OUTREACH (OUTPATIENT)
Dept: CARE COORDINATION | Facility: CLINIC | Age: 88
End: 2023-10-26
Payer: COMMERCIAL

## 2023-10-30 ENCOUNTER — OFFICE VISIT (OUTPATIENT)
Dept: INTERNAL MEDICINE | Facility: CLINIC | Age: 88
End: 2023-10-30
Payer: COMMERCIAL

## 2023-10-30 VITALS
OXYGEN SATURATION: 98 % | HEART RATE: 58 BPM | SYSTOLIC BLOOD PRESSURE: 128 MMHG | RESPIRATION RATE: 16 BRPM | DIASTOLIC BLOOD PRESSURE: 65 MMHG | TEMPERATURE: 97.7 F

## 2023-10-30 DIAGNOSIS — L97.821 VENOUS STASIS ULCER OF OTHER PART OF LEFT LOWER LEG LIMITED TO BREAKDOWN OF SKIN WITHOUT VARICOSE VEINS (H): Primary | ICD-10-CM

## 2023-10-30 DIAGNOSIS — I87.8 VENOUS STASIS: ICD-10-CM

## 2023-10-30 DIAGNOSIS — I82.5Y2 CHRONIC DEEP VEIN THROMBOSIS (DVT) OF PROXIMAL VEIN OF LEFT LOWER EXTREMITY (H): ICD-10-CM

## 2023-10-30 DIAGNOSIS — I87.2 VENOUS STASIS ULCER OF OTHER PART OF LEFT LOWER LEG LIMITED TO BREAKDOWN OF SKIN WITHOUT VARICOSE VEINS (H): Primary | ICD-10-CM

## 2023-10-30 DIAGNOSIS — F32.1 CURRENT MODERATE EPISODE OF MAJOR DEPRESSIVE DISORDER WITHOUT PRIOR EPISODE (H): ICD-10-CM

## 2023-10-30 PROCEDURE — 99214 OFFICE O/P EST MOD 30 MIN: CPT | Performed by: INTERNAL MEDICINE

## 2023-10-30 RX ORDER — RESPIRATORY SYNCYTIAL VIRUS VACCINE 120MCG/0.5
0.5 KIT INTRAMUSCULAR ONCE
Qty: 1 EACH | Refills: 0 | Status: CANCELLED | OUTPATIENT
Start: 2023-10-30 | End: 2023-10-30

## 2023-10-30 RX ORDER — SERTRALINE HYDROCHLORIDE 25 MG/1
25 TABLET, FILM COATED ORAL DAILY
Qty: 90 TABLET | Refills: 1 | Status: SHIPPED | OUTPATIENT
Start: 2023-10-30 | End: 2023-11-27

## 2023-10-30 NOTE — PROGRESS NOTES
Office Visit - Follow Up   Ruth Mack   89 year old female    Date of Visit: 10/30/2023    Chief Complaint   Patient presents with    ER/HOSPITAL FOLLOW UP     Recheck Medication     Pt is here for follow up         Assessment and Plan   1. Venous stasis ulcer of other part of left lower leg limited to breakdown of skin without varicose veins (H)  There is no evidence of ongoing cellulitis.  Vaseline is applied to the ulcers and covered with a nonadherent bandage and then her leg is wrapped with an Ace bandage from the foot up to her knee.  We discussed the importance of leg elevation and she is going to try and elevate her leg above the level of her heart for 30 minutes 3-4 times a day and continue with Ace bandage wrapping.  I would like her to be seen in the wound clinic as well as by lymphedema for potential for short stretch compression garments or other treatment as recommended  - Wound Care Referral; Future  - Lymphedema Therapy Referral; Future    2. Venous stasis    3. H/o DVT, provoked, remote  She is on anticoagulation, Eliquis    4. Current moderate episode of major depressive disorder without prior episode (H)  Also complaining of some depressed mood with her current situation, start sertraline and follow-up within 1 month  - sertraline (ZOLOFT) 25 MG tablet; Take 1 tablet (25 mg) by mouth daily  Dispense: 90 tablet; Refill: 1    Return in about 4 weeks (around 11/27/2023) for using a video visit.     History of Present Illness   This 89 year old woman comes in with leg swelling sores and recent cellulitis.  This was treated with Keflex in the redness/cellulitis has improved but her legs remain quite swollen.       Physical Exam   General Appearance:   No acute distress    /65 (BP Location: Left arm, Patient Position: Sitting, Cuff Size: Adult Regular)   Pulse 58   Temp 97.7  F (36.5  C) (Tympanic)   Resp 16   LMP  (LMP Unknown)   SpO2 98%     Marked edema left greater than right leg  with venous stasis ulcers on the left lower leg without evidence of infection.     Additional Information   Current Outpatient Medications   Medication Sig Dispense Refill    acetaminophen (TYLENOL) 650 MG CR tablet Take 2 tablets (1,300 mg) by mouth every 8 hours as needed for mild pain 90 tablet 4    apixaban ANTICOAGULANT (ELIQUIS ANTICOAGULANT) 5 MG tablet Take 1 tablet (5 mg) by mouth 2 times daily 180 tablet 4    atorvastatin (LIPITOR) 20 MG tablet Take 1 tablet (20 mg) by mouth daily 90 tablet 0    busPIRone (BUSPAR) 15 MG tablet TAKE 1 TABLET BY MOUTH EVERY DAY AS NEEDED 90 tablet 3    metoprolol tartrate (LOPRESSOR) 25 MG tablet TAKE 1 TABLET BY MOUTH TWICE A  tablet 0    sertraline (ZOLOFT) 25 MG tablet Take 1 tablet (25 mg) by mouth daily 90 tablet 1    vitamin D3 25 mcg (1000 units) tablet Take 1 tablet (25 mcg) by mouth daily 100 tablet 1       Time:      Steve Puente MD

## 2023-11-02 NOTE — PATIENT INSTRUCTIONS
Continue to wear your compression wraps per lymphedema therapy.     It is recommended that you elevate your legs throughout the day: approx 2-3 times each day  Elevate them above the level of your heart for 30 min.   Ways to do this:   Lay on the couch or your bed and prop your legs up on pillows   Recline back as far as you can go in your recliner and prop your legs on pillows.     Doing these things will help reduce the edema in your legs.

## 2023-11-03 ENCOUNTER — THERAPY VISIT (OUTPATIENT)
Dept: PHYSICAL THERAPY | Facility: CLINIC | Age: 88
End: 2023-11-03
Attending: INTERNAL MEDICINE
Payer: COMMERCIAL

## 2023-11-03 DIAGNOSIS — L97.821 VENOUS STASIS ULCER OF OTHER PART OF LEFT LOWER LEG LIMITED TO BREAKDOWN OF SKIN WITHOUT VARICOSE VEINS (H): ICD-10-CM

## 2023-11-03 DIAGNOSIS — I87.2 VENOUS STASIS ULCER OF OTHER PART OF LEFT LOWER LEG LIMITED TO BREAKDOWN OF SKIN WITHOUT VARICOSE VEINS (H): ICD-10-CM

## 2023-11-03 PROCEDURE — 97535 SELF CARE MNGMENT TRAINING: CPT | Mod: GP | Performed by: PHYSICAL THERAPIST

## 2023-11-03 PROCEDURE — 97140 MANUAL THERAPY 1/> REGIONS: CPT | Mod: GP | Performed by: PHYSICAL THERAPIST

## 2023-11-03 PROCEDURE — 97110 THERAPEUTIC EXERCISES: CPT | Mod: GP | Performed by: PHYSICAL THERAPIST

## 2023-11-03 PROCEDURE — 97161 PT EVAL LOW COMPLEX 20 MIN: CPT | Mod: GP | Performed by: PHYSICAL THERAPIST

## 2023-11-03 NOTE — PROGRESS NOTES
PHYSICAL THERAPY EVALUATION  Type of Visit: Evaluation    See electronic medical record for Abuse and Falls Screening details.    Subjective       Presenting condition or subjective complaint: swelling in left leg and footL   Date of onset: 08/09/23    Relevant medical history:   Pt with history of chronic L LE DVT.  She presented to primary care on 8/9/23 with L venous ulcer of her ankle.  She was found to have acute superficial femoral vein occlusive thrombus.  She is on Eliquis.  HTN, hyperlipidemia, anxiety disorder, chronic DVT, Vit D deficiency, remote history of DVT without permanent deficit, arthritis, Bladder problems, [place]  Dates & types of surgery:  RTC repair 2003, likely some surgery at age 45.     Prior diagnostic imaging/testing results: Other US for DVT   Prior therapy history for the same diagnosis, illness or injury: No used compression socks howeverTried a compression socks for about 10 years after a fracture. Did some transition care at her facility but has not had therapy recently.  Pt does have exercises still but is not doing them regularly.     Prior Level of Function  Transfers: Assistive equipment  Ambulation: Assistive equipment, 4WW very slowly  ADL: assistive devices for bathing shower chair and good grab bars  IADL:  assistance from her children, mostly daughter Elizabeth    Living Environment  Social support: Alone   Type of home: Apartment/condo   Stairs to enter the home: No       Ramp:     Stairs inside the home: No       Help at home: Assist for driving and community activities  Equipment owned: Straight Cane; Four-point cane; Walker; Walker with wheels; Crutches; Standard wheelchair; Bedrail; Bath bench; Lift chair     Employment: No    Hobbies/Interests: crossword puzzles    Patient goals for therapy: Be able to walk    Pain assessment:  achy joints     Objective       EDEMA EVALUATION  Additional history:  Body part affected by edema:  L LE mostly, little bit R lower leg  If cancer  related, treatment:    If not cancer related, problems with veins or cause of swelling:  blood clots, not sure  Distance able to walk:  household, really just in the apartment  Time able to stand:  a few minutes  Sensation problems in hands/feet:    reports numbness, able to discriminate touch  Edema etiology: Chronic Venous Insufficiency, immobility, DVT    FUNCTIONAL SCALES  LLIS initiated but not completed due to time.    Cognitive Status Examination  Orientation: Oriented to person, place and time   Level of Consciousness: Alert  Follows Commands and Answers Questions: 75% of the time  Personal Safety and Judgement: Intact  Memory: Intact    EDEMA  Skin Condition: Dryness, increased volume from foot to hip on the L, Soft with pitting above the ankle on B legs 2+, photo for L supervificial wounds, started as blisters.  Feet are cool, purple toes when dependent  Scar: No  Capillary Refill: Decreased  Stemmer Sign: -  Ulceration: Yes as noted, light yellow drainage, no odor, no exudate.    GIRTH MEASUREMENTS: Refer to separate girth measurement flowsheet.     VOLUME LE  Right LE (mL) 6793.42   Left LE (mL) 7981.33   LE Volume Comparison LLE volume greater than RLE volume   % Difference 17.5     RANGE OF MOTION:  Pt with decreased ROM due arthritis. She is able to don her shoes slowly but limited by ROM in ease, no specific joint dysfunction.  STRENGTH:  decreased UE and LE strength. Needs use of arms to stand from WC and little boost  POSTURE:  sitting with forward head. Pt does not stand fully upright with transfers. She is able to lie flat very well just on 1 pillow.   PALPATION: no complaints of pain or tenderness  ACTIVITIES OF DAILY LIVING: assisted for shoes due to high mat height  BED MOBILITY: Mod Assist on hard mat table  TRANSFERS: Min Assist, very slow and cautious  GAIT/LOCOMOTION: did not walk, did not bring her walker today  BALANCE:  known impairments not assessed. Sitting balance appropriate even  unsupport dangling EOM  SENSATION:  touch intact  VASCULAR: Vascular concerns, blue toes when dependent    Assessment & Plan   CLINICAL IMPRESSIONS  Medical Diagnosis: Venous stasis ulcers    Treatment Diagnosis: Lymphedema   Impression/Assessment: Patient is a 89 year old female with swelling and wound complaints.  The following significant findings have been identified: Decreased ROM/flexibility, Decreased strength, Impaired balance, Edema, Impaired gait, Impaired muscle performance, Decreased activity tolerance, and impaired skin integrity . These impairments interfere with their ability to perform self care tasks, household chores, household mobility, and community mobility as compared to previous level of function.     Clinical Decision Making (Complexity):  Clinical Presentation: Stable/Uncomplicated  Clinical Presentation Rationale: based on medical and personal factors listed in PT evaluation  Clinical Decision Making (Complexity): Low complexity    PLAN OF CARE  Treatment Interventions:  Interventions: Manual Therapy, Therapeutic Exercise, Self-Care/Home Management, fit for garment    Long Term Goals     PT Goal 1  Goal Identifier: Lymphedema Home Program  Goal Description: Pt will be independent with drainage exercise, positioning, and skin care to best manage swelling to decrease symptoms.  Target Date: 01/01/24  PT Goal 2  Goal Identifier: Volume  Goal Description: Pt will have 5%reduction in L LE volume for improved tissue integrity, wound healing, decreased risk of infection, and wound healing  Target Date: 01/01/24  PT Goal 3  Goal Identifier: Maintenance  Goal Description: Pt will use compression garments as instructed AND home management tools/program for long term management of chronic condition to prevent tissue fibrosis, infection, wound and other secondary sequelae  Target Date: 01/31/24      Frequency of Treatment: 4 visits  Duration of Treatment: 90 days    Recommended Referrals to Other  Professionals: Physical Therapy, recommended pt consider homecare again due to falls  Education Assessment:   Learner/Method: Patient;Family  Education Comments: plan, use of bandages and garments    Risks and benefits of evaluation/treatment have been explained.   Patient/Family/caregiver agrees with Plan of Care.     Evaluation Time:     PT Eval, Low Complexity Minutes (35196): 15       Signing Clinician: Lori Espinoza, PT      [facility]                                                                                   OUTPATIENT PHYSICAL THERAPY      PLAN OF TREATMENT FOR OUTPATIENT REHABILITATION   Patient's Last Name, First Name, Ruth Bobby YOB: 1931   Provider's Name   [facility]   Medical Record No.  [id number]     Onset Date: 08/09/23  Start of Care Date: 11/03/23     Medical Diagnosis:  Venous stasis ulcers      PT Treatment Diagnosis:  Lymphedema Plan of Treatment  Frequency/Duration: 4 visits/ 90 days    Certification date from 11/03/23 to 01/31/24         See note for plan of treatment details and functional goals     Lori Espinoza, PT                         I CERTIFY THE NEED FOR THESE SERVICES FURNISHED UNDER        THIS PLAN OF TREATMENT AND WHILE UNDER MY CARE     (Physician attestation of this document indicates review and certification of the therapy plan).                Referring Provider:  Steve Puente      Initial Assessment  See Epic Evaluation- Start of Care Date: 11/03/23

## 2023-11-09 ENCOUNTER — OFFICE VISIT (OUTPATIENT)
Dept: VASCULAR SURGERY | Facility: CLINIC | Age: 88
End: 2023-11-09
Attending: INTERNAL MEDICINE
Payer: COMMERCIAL

## 2023-11-09 ENCOUNTER — PATIENT OUTREACH (OUTPATIENT)
Dept: CARE COORDINATION | Facility: CLINIC | Age: 88
End: 2023-11-09

## 2023-11-09 VITALS — SYSTOLIC BLOOD PRESSURE: 134 MMHG | DIASTOLIC BLOOD PRESSURE: 78 MMHG | HEART RATE: 80 BPM | OXYGEN SATURATION: 94 %

## 2023-11-09 DIAGNOSIS — R60.0 LOWER EXTREMITY EDEMA: ICD-10-CM

## 2023-11-09 DIAGNOSIS — L97.922 ULCER OF LEFT LOWER EXTREMITY WITH FAT LAYER EXPOSED (H): Primary | ICD-10-CM

## 2023-11-09 DIAGNOSIS — I87.2 VENOUS STASIS ULCER OF OTHER PART OF LEFT LOWER LEG LIMITED TO BREAKDOWN OF SKIN WITHOUT VARICOSE VEINS (H): ICD-10-CM

## 2023-11-09 DIAGNOSIS — L97.821 VENOUS STASIS ULCER OF OTHER PART OF LEFT LOWER LEG LIMITED TO BREAKDOWN OF SKIN WITHOUT VARICOSE VEINS (H): ICD-10-CM

## 2023-11-09 PROCEDURE — G0463 HOSPITAL OUTPT CLINIC VISIT: HCPCS | Performed by: FAMILY MEDICINE

## 2023-11-09 PROCEDURE — 99204 OFFICE O/P NEW MOD 45 MIN: CPT | Performed by: FAMILY MEDICINE

## 2023-11-09 ASSESSMENT — PAIN SCALES - GENERAL: PAINLEVEL: NO PAIN (0)

## 2023-11-09 NOTE — PROGRESS NOTES
Wound Clinic Note         Visit date: 11/09/2023       Emif Complaint:     Ruth Mack is a 89 year old  female had concerns including Consult.  The patient has lower extremity edema and a left leg ulcer         HISTORY OF PRESENT ILLNESS:    Ruth Mack reports the wound has been present since early October 2023.  The wound began as a water blister that ruptured.   In 1979 she had a leg fracture and developed a left leg DVT while she was recovering from that.  Since then she has always had more swelling in the left leg on the right leg.  More recently however she started developing even more swelling in the left leg and water blisters developed she then had a new ultrasound performed which showed a new left leg DVT.  She is now on Eliquis for that.    She has been working with the lymphedema clinic who have been applying a lymphedema wrap changed once a week at the lymphedema clinic.  They have been bandaging the area with Vaseline gauze and lymphedema wrap.  There is been light serous drainage from the area.  She has further appointments at the lymphedema clinic scheduled.  They have also ordered her a Velcro compression garment which she expects to be receiving within the next week or 2.    The pateint denies fevers or chills.  They report the pain from the wound has been 0/10 and has remained about the same recently.      The patient reports they currently do not have any routine for elevating their legs.     Today the patient reports maintaining a regular diet without special attention to protein.        The patient denies a history of diabetes, smoking or chronic steroid use.         The patient has not had any symptoms of infection relating to the wound recently and is not currently on antibiotics.       Problem List:   Past Medical History:   Diagnosis Date    Deep vein thrombosis (H)     Generalized anxiety disorder     Hyperlipidemia     Hypertension     Kidney stone     Nephrolithiasis      Obesity     TIA (transient ischemic attack)              Family Hx: family history includes Cancer in her daughter, mother, and sister; Heart Disease in her father and mother; No Known Problems in her son, son, and son.       Surgical Hx:   Past Surgical History:   Procedure Laterality Date    CATARACT EXTRACTION Bilateral     SHOULDER SURGERY Left           Allergies:    Allergies   Allergen Reactions    Codeine Other (See Comments)     hallucinations              Medication History:    Current Outpatient Medications   Medication Sig    acetaminophen (TYLENOL) 650 MG CR tablet Take 2 tablets (1,300 mg) by mouth every 8 hours as needed for mild pain    apixaban ANTICOAGULANT (ELIQUIS ANTICOAGULANT) 5 MG tablet Take 1 tablet (5 mg) by mouth 2 times daily    atorvastatin (LIPITOR) 20 MG tablet Take 1 tablet (20 mg) by mouth daily    busPIRone (BUSPAR) 15 MG tablet TAKE 1 TABLET BY MOUTH EVERY DAY AS NEEDED    metoprolol tartrate (LOPRESSOR) 25 MG tablet TAKE 1 TABLET BY MOUTH TWICE A DAY    sertraline (ZOLOFT) 25 MG tablet Take 1 tablet (25 mg) by mouth daily    vitamin D3 25 mcg (1000 units) tablet Take 1 tablet (25 mcg) by mouth daily     No current facility-administered medications for this visit.         Tobacco History:  reports that she has never smoked. She has never used smokeless tobacco.       REVIEW OF SYMPTOMS:   The review of systems was negative except as noted in the HPI.           PHYSICAL EXAMINATION:     /78   Pulse 80   LMP  (LMP Unknown)   SpO2 94%            GENERAL: The patient overall appears well and is no acute distress.   HEAD: normocephalic   EYES: Sclera and conjunctiva clear   NECK: no obvious masses   LUNGS: breathing is unlabored.   EXTREMITIES: No clubbing, cyanosis or edema   SKIN: No rashes or other abnormalities except as noted under the Wound section below.   NEUROLOGICAL: normal motor and sensory function   EDEMA: Moderate  and Lymphedema       WOUND: Healed      Also  "see below for wound details:       Circumferential volume measures:             No data to display                Ulceration(s)/Wound(s):   Please see the media tab under the chart review for pictures of the wounds.  Nursing staff removed dressings and cleansed wound.               No results for input(s): \"HGBA1C\", \"A1C\", \"EAG\" in the last 61761 hours.    Invalid input(s): \"CLPMWREFA5Y\"       Recent Labs   Lab Test 11/25/22  1131 11/05/21  1254 11/27/18  1206   ALBUMIN 4.3 3.8 3.7              No sharp debridement performed today.                  ASSESSMENT:   This is a 89 year old  female with a healed left leg ulcer, the patient also has lower extremity edema which was also managed during today's clinic visit.          PLAN:   No further bandages are required.  I have explained to the patient and her daughter that the most important thing we can do now to prevent the wound from recurring is to continue to control her swelling.    Separate from the wound care instructions we then discussed management strategies for lower extremity edema.  I explained the keys for managing lower extremity edema are compression and elevation.  She will continue to have a lymphedema wrap applied once a week by the lymphedema clinic until she receives her Velcro compression garments.  Then she will use her Velcro compression garments long-term for control of her swelling.  I explained to the patient today that controlling the edema is probably the most important thing we can do to help heal the wound.  I have specifically recommended that they lay down with their legs above the level of the heart for 30 minutes at least twice a day.     I have explained to the patient the importance of protein intake to wound healing.  I have explained that increasing protein intake will speed wound healing.  We discussed several types of food that are high in protein and the wound care nurse gave the patient a handout that summarizes this " information.  In addition to further speed wound healing I have encouraged the patient to take a protein supplement.   The patient will return to the wound clinic on an as-needed basis if further wounds develop.        45 minutes spent on the date of the encounter doing chart review, history and exam, documentation and further activities per the note, this time excludes any procedure time      Musa Meza MD  11/09/2023   10:08 AM   [facility] Vascular/Wound  [phone]    This note was electronically signed by Musa Meza MD

## 2023-11-27 ENCOUNTER — OFFICE VISIT (OUTPATIENT)
Dept: INTERNAL MEDICINE | Facility: CLINIC | Age: 88
End: 2023-11-27
Payer: COMMERCIAL

## 2023-11-27 VITALS
OXYGEN SATURATION: 95 % | SYSTOLIC BLOOD PRESSURE: 134 MMHG | HEART RATE: 53 BPM | HEIGHT: 65 IN | BODY MASS INDEX: 29.95 KG/M2 | RESPIRATION RATE: 16 BRPM | DIASTOLIC BLOOD PRESSURE: 78 MMHG

## 2023-11-27 DIAGNOSIS — I87.8 VENOUS STASIS: ICD-10-CM

## 2023-11-27 DIAGNOSIS — I87.2 VENOUS STASIS ULCER OF OTHER PART OF LEFT LOWER LEG LIMITED TO BREAKDOWN OF SKIN WITHOUT VARICOSE VEINS (H): Primary | ICD-10-CM

## 2023-11-27 DIAGNOSIS — I82.5Y2 CHRONIC DEEP VEIN THROMBOSIS (DVT) OF PROXIMAL VEIN OF LEFT LOWER EXTREMITY (H): ICD-10-CM

## 2023-11-27 DIAGNOSIS — L97.821 VENOUS STASIS ULCER OF OTHER PART OF LEFT LOWER LEG LIMITED TO BREAKDOWN OF SKIN WITHOUT VARICOSE VEINS (H): Primary | ICD-10-CM

## 2023-11-27 DIAGNOSIS — F32.1 CURRENT MODERATE EPISODE OF MAJOR DEPRESSIVE DISORDER WITHOUT PRIOR EPISODE (H): ICD-10-CM

## 2023-11-27 PROCEDURE — 99214 OFFICE O/P EST MOD 30 MIN: CPT | Performed by: INTERNAL MEDICINE

## 2023-11-27 RX ORDER — RESPIRATORY SYNCYTIAL VIRUS VACCINE 120MCG/0.5
0.5 KIT INTRAMUSCULAR ONCE
Qty: 1 EACH | Refills: 0 | Status: CANCELLED | OUTPATIENT
Start: 2023-11-27 | End: 2023-11-27

## 2023-11-27 ASSESSMENT — PATIENT HEALTH QUESTIONNAIRE - PHQ9: SUM OF ALL RESPONSES TO PHQ QUESTIONS 1-9: 10

## 2023-11-27 NOTE — PROGRESS NOTES
"  Office Visit - Follow Up   Ruth Mack   89 year old female    Date of Visit: 11/27/2023    Chief Complaint   Patient presents with    RECHECK     Ulcer left lower extremity        Assessment and Plan   1. Venous stasis ulcer of other part of left lower leg limited to breakdown of skin without varicose veins (H)  2. Venous stasis  Continue with compression and elevation    3. H/o DVT, provoked, remote  Continue with Eliquis    4. Current moderate episode of major depressive disorder without prior episode (H)  She did not start sertraline, continue with buspirone, discussed dosing    Return in about 4 weeks (around 12/25/2023) for Follow up.     History of Present Illness   This 89 year old comes in for follow-up.  She has been seen in the wound clinic as well as lymphedema and is using compression garments.  She had a recent blister which has subsequently popped.  Otherwise feeling well did not start sertraline prefers to use buspirone       Physical Exam   General Appearance:   No acute distress    /78 (BP Location: Right arm, Patient Position: Sitting, Cuff Size: Adult Large)   Pulse 53   Resp 16   Ht 1.651 m (5' 5\")   SpO2 95%   Breastfeeding No   BMI 29.95 kg/m      Edema is much better with compression garments, ulceration is healed nicely     Additional Information   Current Outpatient Medications   Medication Sig Dispense Refill    acetaminophen (TYLENOL) 650 MG CR tablet Take 2 tablets (1,300 mg) by mouth every 8 hours as needed for mild pain 90 tablet 4    apixaban ANTICOAGULANT (ELIQUIS ANTICOAGULANT) 5 MG tablet Take 1 tablet (5 mg) by mouth 2 times daily 180 tablet 4    atorvastatin (LIPITOR) 20 MG tablet Take 1 tablet (20 mg) by mouth daily 90 tablet 0    busPIRone (BUSPAR) 15 MG tablet TAKE 1 TABLET BY MOUTH EVERY DAY AS NEEDED 90 tablet 3    metoprolol tartrate (LOPRESSOR) 25 MG tablet TAKE 1 TABLET BY MOUTH TWICE A  tablet 0    vitamin D3 25 mcg (1000 units) tablet Take 1 " tablet (25 mcg) by mouth daily 100 tablet 1       Time:      Steve Puente MD  Answers submitted by the patient for this visit:  General Questionnaire (Submitted on 11/27/2023)  Chief Complaint: Chronic problems general questions HPI Form  What is the reason for your visit today? : follow up and new blister on lower left leg  How many minutes a day do you exercise enough to make your heart beat faster?: 9 or less  How many days a week do you exercise enough to make your heart beat faster?: 3 or less  How many days per week do you miss taking your medication?: 0    Conflicting answers have been found for some questions. Please document the patient's answers manually.

## 2023-11-30 ENCOUNTER — THERAPY VISIT (OUTPATIENT)
Dept: PHYSICAL THERAPY | Facility: CLINIC | Age: 88
End: 2023-11-30
Attending: INTERNAL MEDICINE
Payer: COMMERCIAL

## 2023-11-30 DIAGNOSIS — L97.821 VENOUS STASIS ULCER OF OTHER PART OF LEFT LOWER LEG LIMITED TO BREAKDOWN OF SKIN WITHOUT VARICOSE VEINS (H): Primary | ICD-10-CM

## 2023-11-30 DIAGNOSIS — I87.2 VENOUS STASIS ULCER OF OTHER PART OF LEFT LOWER LEG LIMITED TO BREAKDOWN OF SKIN WITHOUT VARICOSE VEINS (H): Primary | ICD-10-CM

## 2023-11-30 PROCEDURE — 97535 SELF CARE MNGMENT TRAINING: CPT | Mod: GP | Performed by: PHYSICAL THERAPIST

## 2023-11-30 PROCEDURE — 97140 MANUAL THERAPY 1/> REGIONS: CPT | Mod: GP | Performed by: PHYSICAL THERAPIST

## 2023-12-06 ENCOUNTER — NURSE TRIAGE (OUTPATIENT)
Dept: NURSING | Facility: CLINIC | Age: 88
End: 2023-12-06
Payer: COMMERCIAL

## 2023-12-06 NOTE — TELEPHONE ENCOUNTER
Consent to communicate found. Pain in upper right leg, hospitalized at [facility]. They're finding she's had a bleed into pelvic muscle and upper right leg. To TCU tomorrow. They've halted the Eliquis. No continued bleeding. Hgb stable. They posed question if she wants to resume Eliquis and will monitor HGB. Dr Puente, is there any need to re-evaluate her Eliquis use given the recent situation? They will keep her another 24 hours to monitor. They need Dr Puente's opinion. Please call Elizabeth at:  [phone].  Deirdre Moffett RN  [facility]    Reason for Disposition   Nursing judgment    Additional Information   Negative: New-onset or worsening symptoms, see that protocol (e.g., diarrhea, runny nose, sore throat)   Negative: Medicine question not related to refill or renewal   Negative: Requesting a renewal or refill of a medicine patient is currently taking   Negative: Questions or concerns about high blood pressure   Negative: Nursing judgment    Protocols used: Information Only Call - No Triage-A-[place]

## 2023-12-06 NOTE — TELEPHONE ENCOUNTER
Spoke with Elizabeth and relayed the information below from Dr Romo. Elizabeth verbalized understanding and requests a My Chart message be sent with this information. She has no further questions.

## 2023-12-08 ENCOUNTER — LAB REQUISITION (OUTPATIENT)
Dept: LAB | Facility: CLINIC | Age: 88
End: 2023-12-08
Payer: COMMERCIAL

## 2023-12-08 DIAGNOSIS — I10 ESSENTIAL (PRIMARY) HYPERTENSION: ICD-10-CM

## 2023-12-09 LAB
ANION GAP SERPL CALCULATED.3IONS-SCNC: 7 MMOL/L (ref 7–15)
BUN SERPL-MCNC: 18.6 MG/DL (ref 8–23)
CALCIUM SERPL-MCNC: 9 MG/DL (ref 8.2–9.6)
CHLORIDE SERPL-SCNC: 103 MMOL/L (ref 98–107)
CREAT SERPL-MCNC: 0.5 MG/DL (ref 0.51–0.95)
DEPRECATED HCO3 PLAS-SCNC: 29 MMOL/L (ref 22–29)
EGFRCR SERPLBLD CKD-EPI 2021: 88 ML/MIN/1.73M2
ERYTHROCYTE [DISTWIDTH] IN BLOOD BY AUTOMATED COUNT: 14.6 % (ref 10–15)
GLUCOSE SERPL-MCNC: 91 MG/DL (ref 70–99)
HCT VFR BLD AUTO: 30.6 % (ref 35–47)
HGB BLD-MCNC: 9.9 G/DL (ref 11.7–15.7)
MCH RBC QN AUTO: 31.8 PG (ref 26.5–33)
MCHC RBC AUTO-ENTMCNC: 32.4 G/DL (ref 31.5–36.5)
MCV RBC AUTO: 98 FL (ref 78–100)
PLATELET # BLD AUTO: 178 10E3/UL (ref 150–450)
POTASSIUM SERPL-SCNC: 4.1 MMOL/L (ref 3.4–5.3)
RBC # BLD AUTO: 3.11 10E6/UL (ref 3.8–5.2)
SODIUM SERPL-SCNC: 139 MMOL/L (ref 135–145)
WBC # BLD AUTO: 6 10E3/UL (ref 4–11)

## 2023-12-09 PROCEDURE — 80048 BASIC METABOLIC PNL TOTAL CA: CPT | Performed by: FAMILY MEDICINE

## 2023-12-09 PROCEDURE — 85027 COMPLETE CBC AUTOMATED: CPT | Performed by: FAMILY MEDICINE

## 2023-12-09 PROCEDURE — 36415 COLL VENOUS BLD VENIPUNCTURE: CPT | Performed by: FAMILY MEDICINE

## 2023-12-10 ENCOUNTER — LAB REQUISITION (OUTPATIENT)
Dept: LAB | Facility: CLINIC | Age: 88
End: 2023-12-10
Payer: COMMERCIAL

## 2023-12-10 DIAGNOSIS — K68.3 RETROPERITONEAL HEMATOMA: ICD-10-CM

## 2023-12-11 LAB
ANION GAP SERPL CALCULATED.3IONS-SCNC: 8 MMOL/L (ref 7–15)
BASOPHILS # BLD AUTO: 0.1 10E3/UL (ref 0–0.2)
BASOPHILS NFR BLD AUTO: 1 %
BUN SERPL-MCNC: 24.4 MG/DL (ref 8–23)
CALCIUM SERPL-MCNC: 8.8 MG/DL (ref 8.2–9.6)
CHLORIDE SERPL-SCNC: 104 MMOL/L (ref 98–107)
CREAT SERPL-MCNC: 0.51 MG/DL (ref 0.51–0.95)
DEPRECATED HCO3 PLAS-SCNC: 28 MMOL/L (ref 22–29)
EGFRCR SERPLBLD CKD-EPI 2021: 87 ML/MIN/1.73M2
EOSINOPHIL # BLD AUTO: 0.4 10E3/UL (ref 0–0.7)
EOSINOPHIL NFR BLD AUTO: 6 %
ERYTHROCYTE [DISTWIDTH] IN BLOOD BY AUTOMATED COUNT: 15.5 % (ref 10–15)
GLUCOSE SERPL-MCNC: 94 MG/DL (ref 70–99)
HCT VFR BLD AUTO: 31.3 % (ref 35–47)
HGB BLD-MCNC: 10.1 G/DL (ref 11.7–15.7)
IMM GRANULOCYTES # BLD: 0.1 10E3/UL
IMM GRANULOCYTES NFR BLD: 1 %
LYMPHOCYTES # BLD AUTO: 1.5 10E3/UL (ref 0.8–5.3)
LYMPHOCYTES NFR BLD AUTO: 21 %
MCH RBC QN AUTO: 31.3 PG (ref 26.5–33)
MCHC RBC AUTO-ENTMCNC: 32.3 G/DL (ref 31.5–36.5)
MCV RBC AUTO: 97 FL (ref 78–100)
MONOCYTES # BLD AUTO: 0.8 10E3/UL (ref 0–1.3)
MONOCYTES NFR BLD AUTO: 11 %
NEUTROPHILS # BLD AUTO: 4.2 10E3/UL (ref 1.6–8.3)
NEUTROPHILS NFR BLD AUTO: 60 %
NRBC # BLD AUTO: 0 10E3/UL
NRBC BLD AUTO-RTO: 0 /100
PLATELET # BLD AUTO: 222 10E3/UL (ref 150–450)
POTASSIUM SERPL-SCNC: 4.2 MMOL/L (ref 3.4–5.3)
RBC # BLD AUTO: 3.23 10E6/UL (ref 3.8–5.2)
SODIUM SERPL-SCNC: 140 MMOL/L (ref 135–145)
WBC # BLD AUTO: 7 10E3/UL (ref 4–11)

## 2023-12-11 PROCEDURE — P9604 ONE-WAY ALLOW PRORATED TRIP: HCPCS | Performed by: FAMILY MEDICINE

## 2023-12-11 PROCEDURE — 80048 BASIC METABOLIC PNL TOTAL CA: CPT | Performed by: FAMILY MEDICINE

## 2023-12-11 PROCEDURE — 36415 COLL VENOUS BLD VENIPUNCTURE: CPT | Performed by: FAMILY MEDICINE

## 2023-12-11 PROCEDURE — 85025 COMPLETE CBC W/AUTO DIFF WBC: CPT | Performed by: FAMILY MEDICINE

## 2023-12-12 DIAGNOSIS — E55.9 VITAMIN D DEFICIENCY: ICD-10-CM

## 2023-12-12 RX ORDER — CHOLECALCIFEROL (VITAMIN D3) 25 MCG
1 TABLET ORAL DAILY
Qty: 90 TABLET | Refills: 1 | Status: SHIPPED | OUTPATIENT
Start: 2023-12-12 | End: 2024-06-17

## 2023-12-13 DIAGNOSIS — G45.9 TIA (TRANSIENT ISCHEMIC ATTACK): ICD-10-CM

## 2023-12-13 RX ORDER — ATORVASTATIN CALCIUM 20 MG/1
20 TABLET, FILM COATED ORAL DAILY
Qty: 90 TABLET | Refills: 0 | Status: SHIPPED | OUTPATIENT
Start: 2023-12-13 | End: 2024-03-08

## 2023-12-14 ENCOUNTER — LAB REQUISITION (OUTPATIENT)
Dept: LAB | Facility: CLINIC | Age: 88
End: 2023-12-14
Payer: COMMERCIAL

## 2023-12-14 DIAGNOSIS — U07.1 COVID-19: ICD-10-CM

## 2023-12-15 LAB
ANION GAP SERPL CALCULATED.3IONS-SCNC: 8 MMOL/L (ref 7–15)
BASOPHILS # BLD AUTO: 0.1 10E3/UL (ref 0–0.2)
BASOPHILS NFR BLD AUTO: 1 %
BUN SERPL-MCNC: 20.1 MG/DL (ref 8–23)
CALCIUM SERPL-MCNC: 8.7 MG/DL (ref 8.2–9.6)
CHLORIDE SERPL-SCNC: 103 MMOL/L (ref 98–107)
CREAT SERPL-MCNC: 0.56 MG/DL (ref 0.51–0.95)
CRP SERPL-MCNC: 41.9 MG/L
DEPRECATED HCO3 PLAS-SCNC: 29 MMOL/L (ref 22–29)
EGFRCR SERPLBLD CKD-EPI 2021: 85 ML/MIN/1.73M2
EOSINOPHIL # BLD AUTO: 0.2 10E3/UL (ref 0–0.7)
EOSINOPHIL NFR BLD AUTO: 4 %
ERYTHROCYTE [DISTWIDTH] IN BLOOD BY AUTOMATED COUNT: 16.3 % (ref 10–15)
GLUCOSE SERPL-MCNC: 85 MG/DL (ref 70–99)
HCT VFR BLD AUTO: 33.5 % (ref 35–47)
HGB BLD-MCNC: 10.5 G/DL (ref 11.7–15.7)
IMM GRANULOCYTES # BLD: 0 10E3/UL
IMM GRANULOCYTES NFR BLD: 0 %
LYMPHOCYTES # BLD AUTO: 1.1 10E3/UL (ref 0.8–5.3)
LYMPHOCYTES NFR BLD AUTO: 17 %
MCH RBC QN AUTO: 31.4 PG (ref 26.5–33)
MCHC RBC AUTO-ENTMCNC: 31.3 G/DL (ref 31.5–36.5)
MCV RBC AUTO: 100 FL (ref 78–100)
MONOCYTES # BLD AUTO: 0.8 10E3/UL (ref 0–1.3)
MONOCYTES NFR BLD AUTO: 13 %
NEUTROPHILS # BLD AUTO: 4.4 10E3/UL (ref 1.6–8.3)
NEUTROPHILS NFR BLD AUTO: 65 %
NRBC # BLD AUTO: 0 10E3/UL
NRBC BLD AUTO-RTO: 0 /100
PLATELET # BLD AUTO: 207 10E3/UL (ref 150–450)
POTASSIUM SERPL-SCNC: 4.1 MMOL/L (ref 3.4–5.3)
RBC # BLD AUTO: 3.34 10E6/UL (ref 3.8–5.2)
SODIUM SERPL-SCNC: 140 MMOL/L (ref 135–145)
WBC # BLD AUTO: 6.6 10E3/UL (ref 4–11)

## 2023-12-15 PROCEDURE — 85025 COMPLETE CBC W/AUTO DIFF WBC: CPT | Performed by: FAMILY MEDICINE

## 2023-12-15 PROCEDURE — 86140 C-REACTIVE PROTEIN: CPT | Performed by: FAMILY MEDICINE

## 2023-12-15 PROCEDURE — 36415 COLL VENOUS BLD VENIPUNCTURE: CPT | Performed by: FAMILY MEDICINE

## 2023-12-15 PROCEDURE — 80048 BASIC METABOLIC PNL TOTAL CA: CPT | Performed by: FAMILY MEDICINE

## 2023-12-15 PROCEDURE — P9604 ONE-WAY ALLOW PRORATED TRIP: HCPCS | Performed by: FAMILY MEDICINE

## 2023-12-18 ENCOUNTER — LAB REQUISITION (OUTPATIENT)
Dept: LAB | Facility: CLINIC | Age: 88
End: 2023-12-18
Payer: COMMERCIAL

## 2023-12-18 DIAGNOSIS — K68.3 RETROPERITONEAL HEMATOMA: ICD-10-CM

## 2023-12-19 LAB
HCT VFR BLD AUTO: 35.2 % (ref 35–47)
HGB BLD-MCNC: 11.2 G/DL (ref 11.7–15.7)

## 2023-12-19 PROCEDURE — 36415 COLL VENOUS BLD VENIPUNCTURE: CPT | Performed by: PHYSICIAN ASSISTANT

## 2023-12-19 PROCEDURE — 85018 HEMOGLOBIN: CPT | Performed by: PHYSICIAN ASSISTANT

## 2023-12-19 PROCEDURE — P9603 ONE-WAY ALLOW PRORATED MILES: HCPCS | Performed by: PHYSICIAN ASSISTANT

## 2023-12-19 PROCEDURE — 85014 HEMATOCRIT: CPT | Performed by: PHYSICIAN ASSISTANT

## 2023-12-28 ENCOUNTER — TELEPHONE (OUTPATIENT)
Dept: INTERNAL MEDICINE | Facility: CLINIC | Age: 88
End: 2023-12-28
Payer: COMMERCIAL

## 2023-12-28 NOTE — TELEPHONE ENCOUNTER
December 28, 2023    CHARITO was picked up from outbox of Dr. Romo, covering for Dr. Puente.  Paperwork has been reviewed and is complete.  Per initial initial request, this was sent via fax to [phone].     Ivet Huynh

## 2023-12-30 ENCOUNTER — HEALTH MAINTENANCE LETTER (OUTPATIENT)
Age: 88
End: 2023-12-30

## 2024-01-08 ENCOUNTER — MEDICAL CORRESPONDENCE (OUTPATIENT)
Dept: HEALTH INFORMATION MANAGEMENT | Facility: CLINIC | Age: 89
End: 2024-01-08

## 2024-01-12 DIAGNOSIS — I10 PRIMARY HYPERTENSION: ICD-10-CM

## 2024-01-12 RX ORDER — METOPROLOL TARTRATE 25 MG/1
25 TABLET, FILM COATED ORAL 2 TIMES DAILY
Qty: 180 TABLET | Refills: 2 | Status: SHIPPED | OUTPATIENT
Start: 2024-01-12 | End: 2024-09-27

## 2024-01-16 ENCOUNTER — MYC MEDICAL ADVICE (OUTPATIENT)
Dept: INTERNAL MEDICINE | Facility: CLINIC | Age: 89
End: 2024-01-16
Payer: COMMERCIAL

## 2024-01-17 NOTE — TELEPHONE ENCOUNTER
If it both legs that are swelling, unlikely blood clot though cannot rule it out.  I would not recommend restarting anticoagulation with visit (virtual would probably work if needed).  If there is persisting swelling, then ADS would be appropriate so they can quickly evaluate for possible recurrent DVT.    Eduardo Vogel MD on 1/17/2024 at 3:43 PM

## 2024-01-17 NOTE — TELEPHONE ENCOUNTER
"Spoke to patient's daughter Elizabeth on consent to communicate.  Elizabeth would like to know if patient should resume taking Eliquis or if she should wait until she is able to see a provider and discuss at the visit.    Patient was hospitalized at [facility] on 12/2/23 for a bleed into pelvic muscle and right leg. Patient was also treated for a bladder infection. Patient was then sent to TCU.  Patient's Eliquis was discontinued in the hospital and never resumed.  Patient does have a history of DVT.      Patient has since returned home and daughter states patients legs are starting to swell again.  Daughter states they are not as swollen as they were previously, but have worsened since returning home.  Writer attempted to ask if patient has any signs of blood clots, however daughter was not with patient.    Writer spoke to patient to assess symptoms.  Patient states her legs are \"a little puffy\", but not \"as bad as they have been\".  Patient denies any shortness of breath.  Patient also denies any warmth, redness or pain in legs.      Patient did have follow up appointment scheduled today, however they had to cancel due to patient's therapy schedule. Daughter will call and schedule a follow up appointment with an available provider ASAP.    Per chart review, the following advisement was given by a covering provider on 12/6/23:   Arley Romo MD    to Jaime Blanco RN   JR     12/6/23  1:07 PM   Please tell her or her family that she should remain off the blood thinner at least 2 weeks.  She could stop it altogether. There would be some risk of getting another leg clot like she had years before.  It is hard to say what is best, about restarting it or not.  The danger of getting another clot would not be high, and everyone would then know she needs to blood thinner.  She could start the eliquis at a lower dose too, 2.5 mg twice daily, rather than 5 mg twice daily as another option.  Dr. Demetrius MD  "

## 2024-01-18 NOTE — TELEPHONE ENCOUNTER
This sounds like a very complicated situation and patient should be scheduled for hospital follow-up with any available provider as soon as possible to review and examine her.

## 2024-01-18 NOTE — TELEPHONE ENCOUNTER
Spoke with Elizabeth ( daughter). Patients bilateral lower extremities have improved since recent hospitalization. Patient reports minimal pain.  Patient does not seem to be bothered with her legs.  Skin is a little tight, and dry, lotion applied daily to prevent break down. Patient sleeps in a recliner at night, resulting in legs dependent most of the time. Patient is receiving physical therapy to increase mobility.  Patient would rather not restart Eliquis as she feels the medication was the cause of her hospitalization.  Reviewed signs /symptoms of a blood clot; throbbing pain, swelling, purple, red or darkened skin around the painful area, warm skin around the painful area, swollen veins,and change in edema. Advised patient to change positions often, wear compression stockings, elevate legs when sitting. Reviewed  provider assessment options including virtual visit, UC, and ADS. Elizabeth will  see patient tomorrow and send via My Chart her assessment and pictures of patient's legs for provider review. Advised Elizabeth to have patient seen for evaluation should patient report any changes including SOB and increased pain.        Update to provider.

## 2024-01-24 ENCOUNTER — TELEPHONE (OUTPATIENT)
Dept: INTERNAL MEDICINE | Facility: CLINIC | Age: 89
End: 2024-01-24
Payer: COMMERCIAL

## 2024-01-24 NOTE — TELEPHONE ENCOUNTER
January 24, 2024    Home health orders was received via fax for Dr. Alvarado (covering for Dr Puente) to sign.  Patient label was attached to paperwork and placed in provider's inbox to be signed.    Claire Sawant

## 2024-01-25 DIAGNOSIS — R60.0 LOWER EXTREMITY EDEMA: ICD-10-CM

## 2024-01-25 DIAGNOSIS — I10 PRIMARY HYPERTENSION: Primary | ICD-10-CM

## 2024-01-25 RX ORDER — TRIAMTERENE/HYDROCHLOROTHIAZID 37.5-25 MG
1 TABLET ORAL DAILY
Qty: 15 TABLET | Refills: 0 | Status: SHIPPED | OUTPATIENT
Start: 2024-01-25 | End: 2024-01-30

## 2024-01-29 ENCOUNTER — TELEPHONE (OUTPATIENT)
Dept: INTERNAL MEDICINE | Facility: CLINIC | Age: 89
End: 2024-01-29
Payer: COMMERCIAL

## 2024-01-29 NOTE — TELEPHONE ENCOUNTER
January 29, 2024    Home health orders was received via fax for Dr. Riley (covering for Dr Puente) to sign.  Patient label was attached to paperwork and placed in provider's inbox to be signed.    Claire Sawant

## 2024-01-30 DIAGNOSIS — R60.0 LOWER EXTREMITY EDEMA: ICD-10-CM

## 2024-01-30 DIAGNOSIS — I10 PRIMARY HYPERTENSION: ICD-10-CM

## 2024-01-30 RX ORDER — TRIAMTERENE/HYDROCHLOROTHIAZID 37.5-25 MG
1 TABLET ORAL DAILY
Qty: 90 TABLET | Refills: 3 | Status: SHIPPED | OUTPATIENT
Start: 2024-01-30 | End: 2025-01-29

## 2024-01-31 ENCOUNTER — TELEPHONE (OUTPATIENT)
Dept: INTERNAL MEDICINE | Facility: CLINIC | Age: 89
End: 2024-01-31
Payer: COMMERCIAL

## 2024-01-31 NOTE — PROGRESS NOTES
DISCHARGE  Reason for Discharge: Pt met 2/3 goals. Maintenance goal not assessed as pt did not return to therapy, She did have change in medical status and with home health currently    Equipment Issued: daughter obtained velcro compression wrap online    Discharge Plan: Patient to continue home program.    Referring Provider:  Steve Puente     11/30/23 0500   Appointment Info   Signing clinician's name / credentials Lori Espinoza, PT,CLT-RICHARD   Visits Used 2   Medical Diagnosis Venous stasis ulcers   PT Tx Diagnosis Lymphedema   Quick Adds Certification   Progress Note/Certification   Start of Care Date 11/03/23   Onset of illness/injury or Date of Surgery 08/09/23   Therapy Frequency 4 visits   Predicted Duration 90 days   Certification date from 11/03/23   Certification date to 01/31/24   Progress Note Due Date 01/31/24   Progress Note Completed Date 11/03/23   PT Goal 1   Goal Identifier Lymphedema Home Program   Goal Description Pt will be independent with drainage exercise, positioning, and skin care to best manage swelling to decrease symptoms.   Target Date 01/01/24   Date Met 11/30/23   PT Goal 2   Goal Identifier Volume   Goal Description Pt will have 5%reduction in L LE volume for improved tissue integrity, wound healing, decreased risk of infection, and wound healing   Target Date 01/01/24   Date Met 11/30/23   Goal Progress 7%   PT Goal 3   Goal Identifier Maintenance   Goal Description Pt will use compression garments as instructed AND home management tools/program for long term management of chronic condition to prevent tissue fibrosis, infection, wound and other secondary sequelae   Target Date 01/31/24   Goal Progress Pt demonstrates independence today, will call before goal date if having trouble   Subjective Report   Subjective Report Pt being seen today at [facility] location, due to therapist moving locations.  She will continue to be seen in [facility] location.   Objective  Measure 1   Objective Measure Volume L LE 10-60cm   Details 7420.67mL   Objective Measure 2   Objective Measure Edema   Details L LE improved, R slightly increased, pt will get wrap for the R as well.  No wounds   Manual Therapy   Manual Therapy: Mobilization, MFR, MLD, friction massage minutes (22403) 10   Manual Therapy 1 measurements   Manual Therapy 1 - Details measurements and assessment of B LE.  R with 2+ pitting, L with pitting above the compression velcro wrap.  Measurements of the R are increased but the L is decreased especially below the knee.   Self Care/home Management   ADL/Home Mgmt Training (38362) 45   Self Care 1 home management and risk reduction education   Self Care 1 - Details pt having pain in the R hip limiting heelslides as well as walking and bed mobility.  She still elevates in lift chair but pain getting in and out of bed. Pt demos gait with 4WW and told to walk taller for less work to advance the R LE and better clearance. She is shown how to use a belt to lift the R LE into and from bed. Still pain but able to do more easily. Pt encourged to call MD, may be muscles or might be arthritis but MD could recommend what for pain relief and consider x-ray.   Self Care 2 Compression   Self Care 2 - Details Training with velcro wrap. Pt instructed to fold the top down of the liner sock otherwise presented with good tension and placement. Daugther asked about the accutabs and educated on how they work but ok to not use them. Pt educated on good tension and if able to pull up wraps alittle higher toward knee.  CLT applied so pt could feel.  Pt was able to doff and then don herself with min cues.  pt is encouraged to now get wrap for the R LE as she is doing well with the L. Daughter to help order same size.  pt asked about more socks and given this info as well.  She is educated on care and when to replace.   Education   Learner/Method Patient;Family   Education Comments edema management   Plan    Home program continue home program   Updates to plan of care call if needs, otherwise dc at goal date   Plan for next session assess needs   Total Session Time   Timed Code Treatment Minutes 55   Total Treatment Time (sum of timed and untimed services) 55

## 2024-01-31 NOTE — TELEPHONE ENCOUNTER
January 31, 2024    Home health orders was picked up from outbox of Dr. Alvarado.  Paperwork has been reviewed and is complete.  Per initial initial request, this was sent via fax to [phone].     Claire Sawant

## 2024-01-31 NOTE — TELEPHONE ENCOUNTER
January 31, 2024    Home health orders was picked up from outbox of Dr. Alvarado.  Paperwork has been reviewed and is complete.  Per initial initial request, this was sent via fax to [phone].     Claire Sawant

## 2024-01-31 NOTE — TELEPHONE ENCOUNTER
January 31, 2024    Home health orders was received via fax for Dr. Puente to sign.  Patient label was attached to paperwork and placed in provider's inbox to be signed.    Claire Sawant

## 2024-02-01 ENCOUNTER — MEDICAL CORRESPONDENCE (OUTPATIENT)
Dept: HEALTH INFORMATION MANAGEMENT | Facility: CLINIC | Age: 89
End: 2024-02-01
Payer: COMMERCIAL

## 2024-02-01 NOTE — TELEPHONE ENCOUNTER
February 1, 2024    Home health orders was picked up from outbox of Dr. Puente.  Paperwork has been reviewed and is complete.  Per initial initial request, this was sent via fax to [phone].     Claire Sawant

## 2024-02-01 NOTE — TELEPHONE ENCOUNTER
February 1, 2024    Home health orders was picked up from outbox of Dr. Riley.  Paperwork has been reviewed and is complete.  Per initial initial request, this was sent via fax to [phone].     Claire Sawant

## 2024-02-13 ENCOUNTER — TELEPHONE (OUTPATIENT)
Dept: INTERNAL MEDICINE | Facility: CLINIC | Age: 89
End: 2024-02-13
Payer: COMMERCIAL

## 2024-02-13 NOTE — TELEPHONE ENCOUNTER
February 13, 2024    [facility] Physician's Order was received via fax for Dr. Puente to sign.  Patient label was attached to paperwork and placed in provider's inbox to be signed.    Claire Sawant

## 2024-02-13 NOTE — TELEPHONE ENCOUNTER
February 13, 2024    Home health orders was received via fax for Dr. Puente to sign.  Patient label was attached to paperwork and placed in provider's inbox to be signed.    Claire Sawant

## 2024-02-14 ENCOUNTER — OFFICE VISIT (OUTPATIENT)
Dept: INTERNAL MEDICINE | Facility: CLINIC | Age: 89
End: 2024-02-14
Payer: COMMERCIAL

## 2024-02-14 ENCOUNTER — TELEPHONE (OUTPATIENT)
Dept: INTERNAL MEDICINE | Facility: CLINIC | Age: 89
End: 2024-02-14

## 2024-02-14 VITALS
DIASTOLIC BLOOD PRESSURE: 56 MMHG | TEMPERATURE: 97.5 F | HEART RATE: 82 BPM | WEIGHT: 170.9 LBS | HEIGHT: 65 IN | RESPIRATION RATE: 16 BRPM | BODY MASS INDEX: 28.47 KG/M2 | SYSTOLIC BLOOD PRESSURE: 108 MMHG | OXYGEN SATURATION: 94 %

## 2024-02-14 DIAGNOSIS — I87.2 VENOUS INSUFFICIENCY OF BOTH LOWER EXTREMITIES: ICD-10-CM

## 2024-02-14 DIAGNOSIS — I82.5Y2 CHRONIC DEEP VEIN THROMBOSIS (DVT) OF PROXIMAL VEIN OF LEFT LOWER EXTREMITY (H): ICD-10-CM

## 2024-02-14 DIAGNOSIS — F32.1 CURRENT MODERATE EPISODE OF MAJOR DEPRESSIVE DISORDER WITHOUT PRIOR EPISODE (H): ICD-10-CM

## 2024-02-14 DIAGNOSIS — R58 RETROPERITONEAL HEMORRHAGE: ICD-10-CM

## 2024-02-14 DIAGNOSIS — Z00.00 ANNUAL PHYSICAL EXAM: Primary | ICD-10-CM

## 2024-02-14 DIAGNOSIS — E78.2 MIXED HYPERLIPIDEMIA: ICD-10-CM

## 2024-02-14 DIAGNOSIS — I10 PRIMARY HYPERTENSION: ICD-10-CM

## 2024-02-14 PROBLEM — L97.821 VENOUS STASIS ULCER OF OTHER PART OF LEFT LOWER LEG LIMITED TO BREAKDOWN OF SKIN WITHOUT VARICOSE VEINS (H): Status: RESOLVED | Noted: 2023-11-03 | Resolved: 2024-02-14

## 2024-02-14 LAB
ANION GAP SERPL CALCULATED.3IONS-SCNC: 12 MMOL/L (ref 7–15)
BUN SERPL-MCNC: 40.3 MG/DL (ref 8–23)
CALCIUM SERPL-MCNC: 9.8 MG/DL (ref 8.2–9.6)
CHLORIDE SERPL-SCNC: 100 MMOL/L (ref 98–107)
CREAT SERPL-MCNC: 0.67 MG/DL (ref 0.51–0.95)
DEPRECATED HCO3 PLAS-SCNC: 28 MMOL/L (ref 22–29)
EGFRCR SERPLBLD CKD-EPI 2021: 82 ML/MIN/1.73M2
ERYTHROCYTE [DISTWIDTH] IN BLOOD BY AUTOMATED COUNT: 13.5 % (ref 10–15)
GLUCOSE SERPL-MCNC: 92 MG/DL (ref 70–99)
HCT VFR BLD AUTO: 45 % (ref 35–47)
HGB BLD-MCNC: 14.6 G/DL (ref 11.7–15.7)
MCH RBC QN AUTO: 30.2 PG (ref 26.5–33)
MCHC RBC AUTO-ENTMCNC: 32.4 G/DL (ref 31.5–36.5)
MCV RBC AUTO: 93 FL (ref 78–100)
PLATELET # BLD AUTO: 219 10E3/UL (ref 150–450)
POTASSIUM SERPL-SCNC: 4.2 MMOL/L (ref 3.4–5.3)
RBC # BLD AUTO: 4.84 10E6/UL (ref 3.8–5.2)
SODIUM SERPL-SCNC: 140 MMOL/L (ref 135–145)
WBC # BLD AUTO: 6.9 10E3/UL (ref 4–11)

## 2024-02-14 PROCEDURE — 85027 COMPLETE CBC AUTOMATED: CPT | Performed by: INTERNAL MEDICINE

## 2024-02-14 PROCEDURE — 99214 OFFICE O/P EST MOD 30 MIN: CPT | Mod: 25 | Performed by: INTERNAL MEDICINE

## 2024-02-14 PROCEDURE — G0439 PPPS, SUBSEQ VISIT: HCPCS | Performed by: INTERNAL MEDICINE

## 2024-02-14 PROCEDURE — 80048 BASIC METABOLIC PNL TOTAL CA: CPT | Performed by: INTERNAL MEDICINE

## 2024-02-14 PROCEDURE — 36415 COLL VENOUS BLD VENIPUNCTURE: CPT | Performed by: INTERNAL MEDICINE

## 2024-02-14 RX ORDER — RESPIRATORY SYNCYTIAL VIRUS VACCINE 120MCG/0.5
0.5 KIT INTRAMUSCULAR ONCE
Qty: 1 EACH | Refills: 0 | Status: CANCELLED | OUTPATIENT
Start: 2024-02-14 | End: 2024-02-14

## 2024-02-14 ASSESSMENT — PATIENT HEALTH QUESTIONNAIRE - PHQ9
10. IF YOU CHECKED OFF ANY PROBLEMS, HOW DIFFICULT HAVE THESE PROBLEMS MADE IT FOR YOU TO DO YOUR WORK, TAKE CARE OF THINGS AT HOME, OR GET ALONG WITH OTHER PEOPLE: SOMEWHAT DIFFICULT
SUM OF ALL RESPONSES TO PHQ QUESTIONS 1-9: 8
SUM OF ALL RESPONSES TO PHQ QUESTIONS 1-9: 8

## 2024-02-14 NOTE — TELEPHONE ENCOUNTER
February 14, 2024    Home health orders was picked up from outbox of Dr. Puente.  Paperwork has been reviewed and is complete.  Per initial initial request, this was sent via fax to [phone].     Claire Sawant

## 2024-02-14 NOTE — TELEPHONE ENCOUNTER
February 14, 2024    Home health orders was received via fax for Dr. Puente to sign.  Patient label was attached to paperwork and placed in provider's inbox to be signed.    Claire Sawant

## 2024-02-14 NOTE — PROGRESS NOTES
[facility]  [facility]  Steve Puente MD, Internal Medicine  Feb 14, 2024      SUBJECTIVE:   Hayde is a 89 year old, presenting for the following:  Hospital F/U (TCU discharge:  1/5/24, [facility], Right leg bleeding, bladder infection)        2/14/2024     9:48 AM   Additional Questions   Roomed by Anh   Accompanied by Elizabeth daughter         2/14/2024     9:48 AM   Patient Reported Additional Medications   Patient reports taking the following new medications N/A     Are you in the first 12 months of your Medicare coverage?      HPI        Have you ever done Advance Care Planning? (For example, a Health Directive, POLST, or a discussion with a medical provider or your loved ones about your wishes): No, advance care planning information given to patient to review.  Patient plans to discuss their wishes with loved ones or provider.      Healthy Habits  Ability to successfully perform ADLs: With assistance  Hearing impairment: Significant  Home Safety: Safe      8/9/2023    11:14 AM   PHQ-2 ( 1999 [employer])   Q1: Little interest or pleasure in doing things 1   Q2: Feeling down, depressed or hopeless 1   PHQ-2 Score 2   Q1: Little interest or pleasure in doing things Several days   Q2: Feeling down, depressed or hopeless Several days   PHQ-2 Score 2         2/14/2024     9:32 AM   PHQ   PHQ-9 Total Score 8   Q9: Thoughts of better off dead/self-harm past 2 weeks Not at all     Fall Risk: Patient is wheelchair bound     Fall risk       Cognitive Screening Not appropriate due to mental handicap/severe hearing impairment    Reviewed and updated as needed this visit by clinical staff   Tobacco  Allergies  Meds              Reviewed and updated as needed this visit by Provider                  Social History     Tobacco Use    Smoking status: Never    Smokeless tobacco: Never   Substance Use Topics    Alcohol use: Yes     Comment: Alcoholic Drinks/day: rare             11/5/2021    12:07 PM  "  Alcohol Use   Prescreen: >3 drinks/day or >7 drinks/week? No     Do you have a current opioid prescription? No  Do you use any other controlled substances or medications that are not prescribed by a provider? None    Current providers sharing in care for this patient include:   Patient Care Team:  Steve Puente MD as PCP - General (Internal Medicine)  Steve Puente MD as Assigned PCP  Lori Espinoza PT as Physical Therapist (Physical Therapy)  Musa Meza MD as Assigned Pediatric Specialist Provider    The following health maintenance items are reviewed in Epic and correct as of today:  Health Maintenance   Topic Date Due    ZOSTER IMMUNIZATION (1 of 2) Never done    RSV VACCINE (Pregnancy & 60+) (1 - 1-dose 60+ series) Never done    DTAP/TDAP/TD IMMUNIZATION (1 - Tdap) 03/22/2007    MEDICARE ANNUAL WELLNESS VISIT  11/25/2023    LIPID  11/25/2023    PHQ-9  08/14/2024    ANNUAL REVIEW OF HM ORDERS  10/30/2024    FALL RISK ASSESSMENT  11/27/2024    ADVANCE CARE PLANNING  11/25/2027    INFLUENZA VACCINE  Completed    Pneumococcal Vaccine: 65+ Years  Completed    COVID-19 Vaccine  Completed    DEPRESSION ACTION PLAN  Addressed    IPV IMMUNIZATION  Aged Out    HPV IMMUNIZATION  Aged Out    MENINGITIS IMMUNIZATION  Aged Out    RSV MONOCLONAL ANTIBODY  Aged Out         Pertinent mammograms are reviewed under the imaging tab.  Review of Systems       OBJECTIVE:   /56 (BP Location: Right arm, Patient Position: Sitting, Cuff Size: Adult Regular)   Pulse 82   Temp 97.5  F (36.4  C) (Tympanic)   Resp 16   Ht 1.651 m (5' 5\")   Wt 77.5 kg (170 lb 14.4 oz)   SpO2 94%   Breastfeeding No   BMI 28.44 kg/m     Estimated body mass index is 28.44 kg/m  as calculated from the following:    Height as of this encounter: 1.651 m (5' 5\").    Weight as of this encounter: 77.5 kg (170 lb 14.4 oz).  Physical Exam  Heart rate controlled lungs clear abdomen soft considerable improvement in lower extremity " "edema    ASSESSMENT / PLAN:   1. Annual physical exam  This is a 89-year-old woman with issues as discussed below    2. Retroperitoneal hemorrhage  Due to hospitalization and nursing home stay, it is my recommendation that she not resume Eliquis.  She did well for many years off of warfarin and then developed a nonocclusive DVT.  If we can manage her venous insufficiency I think her risk of DVT is than her risk of repeat hemorrhage and    3. H/o DVT, provoked, remote  As above    4. Venous insufficiency of both lower extremities  Discussed importance of compression garments and leg elevation    5. Mixed hyperlipidemia  - CBC with platelets; Future  - Basic metabolic panel  (Ca, Cl, CO2, Creat, Gluc, K, Na, BUN); Future  - CBC with platelets  - Basic metabolic panel  (Ca, Cl, CO2, Creat, Gluc, K, Na, BUN)    6. 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 Primary hypertension  Controlled continue same    ED REC REQUIRED  Post Medication Reconciliation Status:  Discharge medications reconciled, continue medications without change    Counseling  Reviewed preventive health counseling, as reflected in patient instructions      BMI  Estimated body mass index is 28.44 kg/m  as calculated from the following:    Height as of this encounter: 1.651 m (5' 5\").    Weight as of this encounter: 77.5 kg (170 lb 14.4 oz).   Weight management plan: Discussed healthy diet and exercise guidelines      She reports that she has never smoked. She has never used smokeless tobacco.      Appropriate preventive services were discussed with this patient, including applicable screening as appropriate for fall prevention, nutrition, physical activity, Tobacco-use cessation, weight loss and cognition.  Checklist reviewing preventive services available has been given to the patient.    Reviewed patients plan of care and provided an AVS. The Basic Care Plan (routine screening as " documented in Health Maintenance) for Ruth meets the Care Plan requirement. This Care Plan has been established and reviewed with the Patient.          Signed Electronically by: Steve Puente MD    Identified Health Risks  I have reviewed Opioid Use Disorder and Substance Use Disorder risk factors and made any needed referrals.

## 2024-02-15 NOTE — TELEPHONE ENCOUNTER
February 15, 2024    Physician's order was picked up from outbox of Dr. Puente.  Paperwork has been reviewed and is complete.  Per initial initial request, this was sent via fax to [phone].     Ivet Huynh

## 2024-02-21 NOTE — TELEPHONE ENCOUNTER
February 21, 2024    Home health orders was picked up from outbox of Dr. Puente.  Paperwork has been reviewed and is complete.  Per initial initial request, this was sent via fax to [phone].     Claire Sawant

## 2024-03-08 DIAGNOSIS — G45.9 TIA (TRANSIENT ISCHEMIC ATTACK): ICD-10-CM

## 2024-03-08 RX ORDER — ATORVASTATIN CALCIUM 20 MG/1
20 TABLET, FILM COATED ORAL DAILY
Qty: 90 TABLET | Refills: 0 | Status: SHIPPED | OUTPATIENT
Start: 2024-03-08 | End: 2024-06-05

## 2024-06-02 DIAGNOSIS — G45.9 TIA (TRANSIENT ISCHEMIC ATTACK): ICD-10-CM

## 2024-06-05 RX ORDER — ATORVASTATIN CALCIUM 20 MG/1
20 TABLET, FILM COATED ORAL DAILY
Qty: 90 TABLET | Refills: 0 | Status: SHIPPED | OUTPATIENT
Start: 2024-06-05 | End: 2024-09-27

## 2024-06-17 DIAGNOSIS — E55.9 VITAMIN D DEFICIENCY: ICD-10-CM

## 2024-06-17 RX ORDER — CHOLECALCIFEROL (VITAMIN D3) 25 MCG
1 TABLET ORAL DAILY
Qty: 90 TABLET | Refills: 1 | Status: SHIPPED | OUTPATIENT
Start: 2024-06-17

## 2024-07-02 ENCOUNTER — MYC MEDICAL ADVICE (OUTPATIENT)
Dept: INTERNAL MEDICINE | Facility: CLINIC | Age: 89
End: 2024-07-02
Payer: COMMERCIAL

## 2024-09-27 DIAGNOSIS — G45.9 TIA (TRANSIENT ISCHEMIC ATTACK): ICD-10-CM

## 2024-09-27 DIAGNOSIS — I10 PRIMARY HYPERTENSION: ICD-10-CM

## 2024-09-27 RX ORDER — ATORVASTATIN CALCIUM 20 MG/1
20 TABLET, FILM COATED ORAL DAILY
Qty: 90 TABLET | Refills: 0 | Status: SHIPPED | OUTPATIENT
Start: 2024-09-27

## 2024-09-27 RX ORDER — METOPROLOL TARTRATE 25 MG/1
25 TABLET, FILM COATED ORAL 2 TIMES DAILY
Qty: 180 TABLET | Refills: 0 | Status: SHIPPED | OUTPATIENT
Start: 2024-09-27

## 2024-11-07 ENCOUNTER — MYC MEDICAL ADVICE (OUTPATIENT)
Dept: INTERNAL MEDICINE | Facility: CLINIC | Age: 89
End: 2024-11-07
Payer: COMMERCIAL

## 2024-11-08 RX ORDER — LIDOCAINE 246 MG/1
PATCH TOPICAL
Qty: 5 PATCH | Refills: 11 | OUTPATIENT
Start: 2024-11-08

## 2024-12-21 DIAGNOSIS — I10 PRIMARY HYPERTENSION: ICD-10-CM

## 2024-12-21 DIAGNOSIS — G45.9 TIA (TRANSIENT ISCHEMIC ATTACK): ICD-10-CM

## 2024-12-23 RX ORDER — METOPROLOL TARTRATE 25 MG/1
25 TABLET, FILM COATED ORAL 2 TIMES DAILY
Qty: 180 TABLET | Refills: 0 | Status: SHIPPED | OUTPATIENT
Start: 2024-12-23

## 2024-12-23 RX ORDER — ATORVASTATIN CALCIUM 20 MG/1
20 TABLET, FILM COATED ORAL DAILY
Qty: 90 TABLET | Refills: 0 | Status: SHIPPED | OUTPATIENT
Start: 2024-12-23

## 2024-12-25 DIAGNOSIS — E55.9 VITAMIN D DEFICIENCY: ICD-10-CM

## 2024-12-26 RX ORDER — VITAMIN B COMPLEX
1 TABLET ORAL DAILY
Qty: 90 TABLET | Refills: 0 | Status: SHIPPED | OUTPATIENT
Start: 2024-12-26

## 2025-01-15 ENCOUNTER — PATIENT OUTREACH (OUTPATIENT)
Dept: CARE COORDINATION | Facility: CLINIC | Age: OVER 89
End: 2025-01-15
Payer: COMMERCIAL

## 2025-01-23 ENCOUNTER — LAB REQUISITION (OUTPATIENT)
Dept: LAB | Facility: CLINIC | Age: OVER 89
End: 2025-01-23
Payer: COMMERCIAL

## 2025-01-23 DIAGNOSIS — I10 ESSENTIAL (PRIMARY) HYPERTENSION: ICD-10-CM

## 2025-01-24 LAB
ANION GAP SERPL CALCULATED.3IONS-SCNC: 10 MMOL/L (ref 7–15)
BASOPHILS # BLD AUTO: 0.1 10E3/UL (ref 0–0.2)
BASOPHILS NFR BLD AUTO: 1 %
BUN SERPL-MCNC: 17.3 MG/DL (ref 8–23)
CALCIUM SERPL-MCNC: 9.9 MG/DL (ref 8.8–10.4)
CHLORIDE SERPL-SCNC: 99 MMOL/L (ref 98–107)
CREAT SERPL-MCNC: 0.59 MG/DL (ref 0.51–0.95)
EGFRCR SERPLBLD CKD-EPI 2021: 84 ML/MIN/1.73M2
EOSINOPHIL # BLD AUTO: 0.3 10E3/UL (ref 0–0.7)
EOSINOPHIL NFR BLD AUTO: 4 %
ERYTHROCYTE [DISTWIDTH] IN BLOOD BY AUTOMATED COUNT: 14.1 % (ref 10–15)
GLUCOSE SERPL-MCNC: 90 MG/DL (ref 70–99)
HCO3 SERPL-SCNC: 30 MMOL/L (ref 22–29)
HCT VFR BLD AUTO: 41.9 % (ref 35–47)
HGB BLD-MCNC: 13.2 G/DL (ref 11.7–15.7)
IMM GRANULOCYTES # BLD: 0 10E3/UL
IMM GRANULOCYTES NFR BLD: 0 %
LYMPHOCYTES # BLD AUTO: 0.8 10E3/UL (ref 0.8–5.3)
LYMPHOCYTES NFR BLD AUTO: 13 %
MCH RBC QN AUTO: 30.6 PG (ref 26.5–33)
MCHC RBC AUTO-ENTMCNC: 31.5 G/DL (ref 31.5–36.5)
MCV RBC AUTO: 97 FL (ref 78–100)
MONOCYTES # BLD AUTO: 0.5 10E3/UL (ref 0–1.3)
MONOCYTES NFR BLD AUTO: 9 %
NEUTROPHILS # BLD AUTO: 4.3 10E3/UL (ref 1.6–8.3)
NEUTROPHILS NFR BLD AUTO: 72 %
NRBC # BLD AUTO: 0 10E3/UL
NRBC BLD AUTO-RTO: 0 /100
PLATELET # BLD AUTO: 250 10E3/UL (ref 150–450)
POTASSIUM SERPL-SCNC: 4.3 MMOL/L (ref 3.4–5.3)
RBC # BLD AUTO: 4.32 10E6/UL (ref 3.8–5.2)
SODIUM SERPL-SCNC: 139 MMOL/L (ref 135–145)
WBC # BLD AUTO: 6 10E3/UL (ref 4–11)

## 2025-01-24 PROCEDURE — 36415 COLL VENOUS BLD VENIPUNCTURE: CPT | Performed by: PHYSICIAN ASSISTANT

## 2025-01-24 PROCEDURE — P9604 ONE-WAY ALLOW PRORATED TRIP: HCPCS | Performed by: PHYSICIAN ASSISTANT

## 2025-01-24 PROCEDURE — 80048 BASIC METABOLIC PNL TOTAL CA: CPT | Performed by: PHYSICIAN ASSISTANT

## 2025-01-24 PROCEDURE — 85025 COMPLETE CBC W/AUTO DIFF WBC: CPT | Performed by: PHYSICIAN ASSISTANT

## 2025-01-29 ENCOUNTER — PATIENT OUTREACH (OUTPATIENT)
Dept: CARE COORDINATION | Facility: CLINIC | Age: OVER 89
End: 2025-01-29
Payer: COMMERCIAL

## 2025-02-10 ENCOUNTER — MEDICAL CORRESPONDENCE (OUTPATIENT)
Dept: HEALTH INFORMATION MANAGEMENT | Facility: CLINIC | Age: OVER 89
End: 2025-02-10

## 2025-02-17 ENCOUNTER — TELEPHONE (OUTPATIENT)
Dept: INTERNAL MEDICINE | Facility: CLINIC | Age: OVER 89
End: 2025-02-17
Payer: COMMERCIAL

## 2025-02-17 NOTE — TELEPHONE ENCOUNTER
February 17, 2025    Home health orders was received via fax for Dr. Puente.  Patient label was attached to paperwork and placed in provider's inbox to be signed.    Claire Sawant

## 2025-02-18 ENCOUNTER — TELEPHONE (OUTPATIENT)
Dept: INTERNAL MEDICINE | Facility: CLINIC | Age: OVER 89
End: 2025-02-18
Payer: COMMERCIAL

## 2025-02-18 NOTE — TELEPHONE ENCOUNTER
2February 18, 2025    Home health orders was received via fax for Dr. Puente.  Patient label was attached to paperwork and placed in provider's inbox to be signed.    Claire Sawant

## 2025-02-20 NOTE — TELEPHONE ENCOUNTER
February 20, 2025    Home health orders was picked up from outbox of Dr. Puente and sent via fax to [phone].    Merline Ann

## 2025-03-11 ENCOUNTER — TELEPHONE (OUTPATIENT)
Dept: INTERNAL MEDICINE | Facility: CLINIC | Age: OVER 89
End: 2025-03-11
Payer: COMMERCIAL

## 2025-03-11 NOTE — TELEPHONE ENCOUNTER
March 11, 2025    Home health orders was received via fax for Dr. Puente.  Patient label was attached to paperwork and placed in provider's inbox to be signed.    Cliare Sawant

## 2025-03-16 ENCOUNTER — HEALTH MAINTENANCE LETTER (OUTPATIENT)
Age: OVER 89
End: 2025-03-16

## 2025-03-20 DIAGNOSIS — G45.9 TIA (TRANSIENT ISCHEMIC ATTACK): ICD-10-CM

## 2025-03-20 RX ORDER — ATORVASTATIN CALCIUM 20 MG/1
20 TABLET, FILM COATED ORAL DAILY
Qty: 90 TABLET | Refills: 0 | Status: SHIPPED | OUTPATIENT
Start: 2025-03-20

## 2025-03-22 DIAGNOSIS — E55.9 VITAMIN D DEFICIENCY: ICD-10-CM

## 2025-03-24 RX ORDER — CHOLECALCIFEROL (VITAMIN D3) 25 MCG
1 TABLET ORAL DAILY
Qty: 90 TABLET | Refills: 0 | Status: SHIPPED | OUTPATIENT
Start: 2025-03-24

## 2025-06-08 ENCOUNTER — LAB REQUISITION (OUTPATIENT)
Dept: LAB | Facility: CLINIC | Age: OVER 89
End: 2025-06-08
Payer: COMMERCIAL

## 2025-06-08 DIAGNOSIS — I50.9 HEART FAILURE, UNSPECIFIED (H): ICD-10-CM

## 2025-06-08 DIAGNOSIS — R22.41 LOCALIZED SWELLING, MASS AND LUMP, RIGHT LOWER LIMB: ICD-10-CM

## 2025-06-10 LAB
ALBUMIN SERPL BCG-MCNC: 4.3 G/DL (ref 3.5–5.2)
ANION GAP SERPL CALCULATED.3IONS-SCNC: 13 MMOL/L (ref 7–15)
BUN SERPL-MCNC: 21.8 MG/DL (ref 8–23)
CALCIUM SERPL-MCNC: 10 MG/DL (ref 8.8–10.4)
CHLORIDE SERPL-SCNC: 101 MMOL/L (ref 98–107)
CREAT SERPL-MCNC: 0.72 MG/DL (ref 0.51–0.95)
EGFRCR SERPLBLD CKD-EPI 2021: 78 ML/MIN/1.73M2
ERYTHROCYTE [DISTWIDTH] IN BLOOD BY AUTOMATED COUNT: 14.4 % (ref 10–15)
GLUCOSE SERPL-MCNC: 89 MG/DL (ref 70–99)
HCO3 SERPL-SCNC: 30 MMOL/L (ref 22–29)
HCT VFR BLD AUTO: 46.3 % (ref 35–47)
HGB BLD-MCNC: 14.4 G/DL (ref 11.7–15.7)
MCH RBC QN AUTO: 30.9 PG (ref 26.5–33)
MCHC RBC AUTO-ENTMCNC: 31.1 G/DL (ref 31.5–36.5)
MCV RBC AUTO: 99 FL (ref 78–100)
NT-PROBNP SERPL-MCNC: 176 PG/ML (ref 0–624)
PHOSPHATE SERPL-MCNC: 3.7 MG/DL (ref 2.5–4.5)
PLATELET # BLD AUTO: 206 10E3/UL (ref 150–450)
POTASSIUM SERPL-SCNC: 4.1 MMOL/L (ref 3.4–5.3)
RBC # BLD AUTO: 4.66 10E6/UL (ref 3.8–5.2)
SODIUM SERPL-SCNC: 144 MMOL/L (ref 135–145)
WBC # BLD AUTO: 7.4 10E3/UL (ref 4–11)

## 2025-06-14 DIAGNOSIS — G45.9 TIA (TRANSIENT ISCHEMIC ATTACK): ICD-10-CM

## 2025-06-16 RX ORDER — ATORVASTATIN CALCIUM 20 MG/1
20 TABLET, FILM COATED ORAL DAILY
Qty: 90 TABLET | Refills: 0 | Status: SHIPPED | OUTPATIENT
Start: 2025-06-16

## 2025-06-21 DIAGNOSIS — E55.9 VITAMIN D DEFICIENCY: ICD-10-CM

## 2025-06-23 RX ORDER — CHOLECALCIFEROL (VITAMIN D3) 25 MCG
1 TABLET ORAL DAILY
Qty: 90 TABLET | Refills: 0 | Status: SHIPPED | OUTPATIENT
Start: 2025-06-23

## 2025-07-25 ENCOUNTER — LAB REQUISITION (OUTPATIENT)
Dept: LAB | Facility: CLINIC | Age: OVER 89
End: 2025-07-25
Payer: COMMERCIAL

## 2025-07-25 DIAGNOSIS — E03.8 OTHER SPECIFIED HYPOTHYROIDISM: ICD-10-CM

## 2025-07-25 DIAGNOSIS — R29.6 REPEATED FALLS: ICD-10-CM

## 2025-07-25 DIAGNOSIS — M54.59 OTHER LOW BACK PAIN: ICD-10-CM

## 2025-07-29 LAB
ANION GAP SERPL CALCULATED.3IONS-SCNC: 12 MMOL/L (ref 7–15)
BUN SERPL-MCNC: 26.7 MG/DL (ref 8–23)
CALCIUM SERPL-MCNC: 9.7 MG/DL (ref 8.8–10.4)
CHLORIDE SERPL-SCNC: 104 MMOL/L (ref 98–107)
CREAT SERPL-MCNC: 0.62 MG/DL (ref 0.51–0.95)
EGFRCR SERPLBLD CKD-EPI 2021: 83 ML/MIN/1.73M2
ERYTHROCYTE [DISTWIDTH] IN BLOOD BY AUTOMATED COUNT: 14.2 % (ref 10–15)
GLUCOSE SERPL-MCNC: 93 MG/DL (ref 70–99)
HCO3 SERPL-SCNC: 28 MMOL/L (ref 22–29)
HCT VFR BLD AUTO: 44.4 % (ref 35–47)
HGB BLD-MCNC: 14 G/DL (ref 11.7–15.7)
MCH RBC QN AUTO: 30.7 PG (ref 26.5–33)
MCHC RBC AUTO-ENTMCNC: 31.5 G/DL (ref 31.5–36.5)
MCV RBC AUTO: 97 FL (ref 78–100)
PLATELET # BLD AUTO: 189 10E3/UL (ref 150–450)
POTASSIUM SERPL-SCNC: 4.2 MMOL/L (ref 3.4–5.3)
RBC # BLD AUTO: 4.56 10E6/UL (ref 3.8–5.2)
SODIUM SERPL-SCNC: 144 MMOL/L (ref 135–145)
TSH SERPL DL<=0.005 MIU/L-ACNC: 2.76 UIU/ML (ref 0.3–4.2)
WBC # BLD AUTO: 6.3 10E3/UL (ref 4–11)

## 2025-08-13 ENCOUNTER — PATIENT OUTREACH (OUTPATIENT)
Dept: CARE COORDINATION | Facility: CLINIC | Age: OVER 89
End: 2025-08-13
Payer: COMMERCIAL